# Patient Record
Sex: FEMALE | Race: WHITE | Employment: OTHER | URBAN - METROPOLITAN AREA
[De-identification: names, ages, dates, MRNs, and addresses within clinical notes are randomized per-mention and may not be internally consistent; named-entity substitution may affect disease eponyms.]

---

## 2017-01-25 RX ORDER — FERROUS SULFATE 7.5 MG/0.5
SYRINGE (EA) ORAL DAILY
COMMUNITY
End: 2017-07-11

## 2017-02-06 ENCOUNTER — HOSPITAL ENCOUNTER (OUTPATIENT)
Dept: PHYSICAL THERAPY | Facility: HOSPITAL | Age: 67
Setting detail: THERAPIES SERIES
Discharge: HOME | End: 2017-02-06
Attending: ORTHOPAEDIC SURGERY
Payer: MEDICARE

## 2017-02-06 ENCOUNTER — LABORATORY ENCOUNTER (OUTPATIENT)
Dept: LAB | Facility: HOSPITAL | Age: 67
End: 2017-02-06
Attending: ORTHOPAEDIC SURGERY
Payer: MEDICARE

## 2017-02-06 DIAGNOSIS — Z01.818 PRE-OP TESTING: ICD-10-CM

## 2017-02-06 DIAGNOSIS — M16.11 OSTEOARTHRITIS OF RIGHT HIP: ICD-10-CM

## 2017-02-06 LAB
ALBUMIN SERPL-MCNC: 4.5 G/DL (ref 3.5–4.8)
ALP LIVER SERPL-CCNC: 61 U/L (ref 50–130)
ALT SERPL-CCNC: 26 U/L (ref 14–54)
ANTIBODY SCREEN: NEGATIVE
APTT PPP: 29 SECONDS (ref 25–34)
AST SERPL-CCNC: 23 U/L (ref 15–41)
BASOPHILS # BLD AUTO: 0.04 X10(3) UL (ref 0–0.1)
BASOPHILS NFR BLD AUTO: 0.5 %
BILIRUB SERPL-MCNC: 0.5 MG/DL (ref 0.1–2)
BUN BLD-MCNC: 22 MG/DL (ref 8–20)
CALCIUM BLD-MCNC: 10.5 MG/DL (ref 8.3–10.3)
CHLORIDE: 101 MMOL/L (ref 101–111)
CO2: 27 MMOL/L (ref 22–32)
CREAT BLD-MCNC: 0.91 MG/DL (ref 0.55–1.02)
EOSINOPHIL # BLD AUTO: 0.05 X10(3) UL (ref 0–0.3)
EOSINOPHIL NFR BLD AUTO: 0.7 %
ERYTHROCYTE [DISTWIDTH] IN BLOOD BY AUTOMATED COUNT: 11.7 % (ref 11.5–16)
GLUCOSE BLD-MCNC: 99 MG/DL (ref 70–99)
HCT VFR BLD AUTO: 41.2 % (ref 34–50)
HGB BLD-MCNC: 14.1 G/DL (ref 12–16)
IMMATURE GRANULOCYTE COUNT: 0.02 X10(3) UL (ref 0–1)
IMMATURE GRANULOCYTE RATIO %: 0.3 %
INR BLD: 0.99 (ref 0.89–1.12)
LYMPHOCYTES # BLD AUTO: 1.67 X10(3) UL (ref 0.9–4)
LYMPHOCYTES NFR BLD AUTO: 22.8 %
M PROTEIN MFR SERPL ELPH: 8.2 G/DL (ref 6.1–8.3)
MCH RBC QN AUTO: 31.9 PG (ref 27–33.2)
MCHC RBC AUTO-ENTMCNC: 34.2 G/DL (ref 31–37)
MCV RBC AUTO: 93.2 FL (ref 81–100)
MONOCYTES # BLD AUTO: 0.53 X10(3) UL (ref 0.1–0.6)
MONOCYTES NFR BLD AUTO: 7.2 %
NEUTROPHIL ABS PRELIM: 5.02 X10 (3) UL (ref 1.3–6.7)
NEUTROPHILS # BLD AUTO: 5.02 X10(3) UL (ref 1.3–6.7)
NEUTROPHILS NFR BLD AUTO: 68.5 %
PLATELET # BLD AUTO: 256 10(3)UL (ref 150–450)
POTASSIUM SERPL-SCNC: 4.4 MMOL/L (ref 3.6–5.1)
PSA SERPL DL<=0.01 NG/ML-MCNC: 13.4 SECONDS (ref 12.3–14.8)
RBC # BLD AUTO: 4.42 X10(6)UL (ref 3.8–5.1)
RED CELL DISTRIBUTION WIDTH-SD: 39.5 FL (ref 35.1–46.3)
RH BLOOD TYPE: POSITIVE
SODIUM SERPL-SCNC: 136 MMOL/L (ref 136–144)
WBC # BLD AUTO: 7.3 X10(3) UL (ref 4–13)

## 2017-02-06 PROCEDURE — 87081 CULTURE SCREEN ONLY: CPT

## 2017-02-06 PROCEDURE — 85730 THROMBOPLASTIN TIME PARTIAL: CPT

## 2017-02-06 PROCEDURE — 86900 BLOOD TYPING SEROLOGIC ABO: CPT

## 2017-02-06 PROCEDURE — 87147 CULTURE TYPE IMMUNOLOGIC: CPT

## 2017-02-06 PROCEDURE — 86850 RBC ANTIBODY SCREEN: CPT

## 2017-02-06 PROCEDURE — 80053 COMPREHEN METABOLIC PANEL: CPT

## 2017-02-06 PROCEDURE — 85610 PROTHROMBIN TIME: CPT

## 2017-02-06 PROCEDURE — 85025 COMPLETE CBC W/AUTO DIFF WBC: CPT

## 2017-02-06 PROCEDURE — 36415 COLL VENOUS BLD VENIPUNCTURE: CPT

## 2017-02-06 PROCEDURE — 86901 BLOOD TYPING SEROLOGIC RH(D): CPT

## 2017-02-10 PROCEDURE — 36415 COLL VENOUS BLD VENIPUNCTURE: CPT | Performed by: FAMILY MEDICINE

## 2017-02-10 PROCEDURE — 80053 COMPREHEN METABOLIC PANEL: CPT | Performed by: FAMILY MEDICINE

## 2017-02-10 PROCEDURE — 83970 ASSAY OF PARATHORMONE: CPT | Performed by: FAMILY MEDICINE

## 2017-02-20 NOTE — H&P
[address]    PATIENT'S NAME: Maged Bennett   ATTENDING PHYSICIAN: Susy Camejo M.D.    PATIENT ACCOUNT#:   [de-identified]    LOCATION:  [facility]  MEDICAL RECORD #:   [id number]       YOB: 1950  ADMISSION DATE:       02/24/2017    HISTORY 2015.    MEDICATIONS:  Mupirocin ointment, pantoprazole, lisinopril-hydrochlorothiazide, simvastatin, Lodine, trazodone, sertraline, ferrous sulfate, aspirin, Flonase, fish oil, cranberry, lysine, multivitamin, magnesium. ALLERGIES:  None.     SOCIAL HIS

## 2017-02-24 ENCOUNTER — SURGERY (OUTPATIENT)
Age: 67
End: 2017-02-24

## 2017-02-24 ENCOUNTER — ANESTHESIA (OUTPATIENT)
Dept: SURGERY | Facility: HOSPITAL | Age: 67
End: 2017-02-24

## 2017-02-24 ENCOUNTER — ANESTHESIA EVENT (OUTPATIENT)
Dept: SURGERY | Facility: HOSPITAL | Age: 67
End: 2017-02-24

## 2017-02-24 ENCOUNTER — APPOINTMENT (OUTPATIENT)
Dept: GENERAL RADIOLOGY | Facility: HOSPITAL | Age: 67
DRG: 470 | End: 2017-02-24
Attending: ORTHOPAEDIC SURGERY
Payer: MEDICARE

## 2017-02-24 ENCOUNTER — HOSPITAL ENCOUNTER (INPATIENT)
Facility: HOSPITAL | Age: 67
LOS: 2 days | Discharge: HOME HEALTH | DRG: 470 | End: 2017-02-26
Attending: ORTHOPAEDIC SURGERY | Admitting: ORTHOPAEDIC SURGERY
Payer: MEDICARE

## 2017-02-24 DIAGNOSIS — M16.11 OSTEOARTHRITIS OF RIGHT HIP: Primary | ICD-10-CM

## 2017-02-24 LAB — CREAT BLD-MCNC: 0.76 MG/DL (ref 0.55–1.02)

## 2017-02-24 PROCEDURE — 94660 CPAP INITIATION&MGMT: CPT

## 2017-02-24 PROCEDURE — 88304 TISSUE EXAM BY PATHOLOGIST: CPT | Performed by: ORTHOPAEDIC SURGERY

## 2017-02-24 PROCEDURE — 88311 DECALCIFY TISSUE: CPT | Performed by: ORTHOPAEDIC SURGERY

## 2017-02-24 PROCEDURE — 73501 X-RAY EXAM HIP UNI 1 VIEW: CPT

## 2017-02-24 PROCEDURE — 97530 THERAPEUTIC ACTIVITIES: CPT

## 2017-02-24 PROCEDURE — 82565 ASSAY OF CREATININE: CPT | Performed by: PHYSICIAN ASSISTANT

## 2017-02-24 PROCEDURE — 0SR903A REPLACEMENT OF RIGHT HIP JOINT WITH CERAMIC SYNTHETIC SUBSTITUTE, UNCEMENTED, OPEN APPROACH: ICD-10-PCS | Performed by: ORTHOPAEDIC SURGERY

## 2017-02-24 PROCEDURE — 76942 ECHO GUIDE FOR BIOPSY: CPT | Performed by: ORTHOPAEDIC SURGERY

## 2017-02-24 PROCEDURE — 97162 PT EVAL MOD COMPLEX 30 MIN: CPT

## 2017-02-24 PROCEDURE — 3E0T3CZ INTRODUCTION OF REGIONAL ANESTHETIC INTO PERIPHERAL NERVES AND PLEXI, PERCUTANEOUS APPROACH: ICD-10-PCS | Performed by: ANESTHESIOLOGY

## 2017-02-24 DEVICE — BIOLOX® DELTA, CERAMIC FEMORAL HEAD, S, Ø 36/-3.5, TAPER 12/14
Type: IMPLANTABLE DEVICE | Site: HIP | Status: FUNCTIONAL
Brand: BIOLOX® DELTA

## 2017-02-24 DEVICE — MOD CUP NEU LNR LG 50/52/54X36: Type: IMPLANTABLE DEVICE | Site: HIP | Status: FUNCTIONAL

## 2017-02-24 DEVICE — IMPLANTABLE DEVICE: Type: IMPLANTABLE DEVICE | Site: HIP | Status: FUNCTIONAL

## 2017-02-24 DEVICE — BONE SCREW 6.5X40 SELF-TAP: Type: IMPLANTABLE DEVICE | Site: HIP | Status: FUNCTIONAL

## 2017-02-24 DEVICE — TM MOD CUP 52MM CLUSTER: Type: IMPLANTABLE DEVICE | Site: HIP | Status: FUNCTIONAL

## 2017-02-24 DEVICE — BONE SCREW 6.5X25 SELF-TAP: Type: IMPLANTABLE DEVICE | Site: HIP | Status: FUNCTIONAL

## 2017-02-24 RX ORDER — DIPHENHYDRAMINE HCL 25 MG
25 CAPSULE ORAL EVERY 4 HOURS PRN
Status: DISCONTINUED | OUTPATIENT
Start: 2017-02-24 | End: 2017-02-26

## 2017-02-24 RX ORDER — BISACODYL 10 MG
10 SUPPOSITORY, RECTAL RECTAL
Status: DISCONTINUED | OUTPATIENT
Start: 2017-02-24 | End: 2017-02-26

## 2017-02-24 RX ORDER — SODIUM CHLORIDE, SODIUM LACTATE, POTASSIUM CHLORIDE, CALCIUM CHLORIDE 600; 310; 30; 20 MG/100ML; MG/100ML; MG/100ML; MG/100ML
INJECTION, SOLUTION INTRAVENOUS CONTINUOUS
Status: DISCONTINUED | OUTPATIENT
Start: 2017-02-24 | End: 2017-02-24

## 2017-02-24 RX ORDER — KETOROLAC TROMETHAMINE 30 MG/ML
15 INJECTION, SOLUTION INTRAMUSCULAR; INTRAVENOUS EVERY 6 HOURS PRN
Status: DISCONTINUED | OUTPATIENT
Start: 2017-02-24 | End: 2017-02-24 | Stop reason: HOSPADM

## 2017-02-24 RX ORDER — PANTOPRAZOLE SODIUM 40 MG/1
40 TABLET, DELAYED RELEASE ORAL
Status: DISCONTINUED | OUTPATIENT
Start: 2017-02-24 | End: 2017-02-26

## 2017-02-24 RX ORDER — ZOLPIDEM TARTRATE 5 MG/1
5 TABLET ORAL NIGHTLY PRN
Status: DISCONTINUED | OUTPATIENT
Start: 2017-02-24 | End: 2017-02-26

## 2017-02-24 RX ORDER — SODIUM PHOSPHATE, DIBASIC AND SODIUM PHOSPHATE, MONOBASIC 7; 19 G/133ML; G/133ML
1 ENEMA RECTAL ONCE AS NEEDED
Status: ACTIVE | OUTPATIENT
Start: 2017-02-24 | End: 2017-02-24

## 2017-02-24 RX ORDER — SCOLOPAMINE TRANSDERMAL SYSTEM 1 MG/1
1 PATCH, EXTENDED RELEASE TRANSDERMAL ONCE
Status: DISCONTINUED | OUTPATIENT
Start: 2017-02-24 | End: 2017-02-26

## 2017-02-24 RX ORDER — OXYCODONE HYDROCHLORIDE 10 MG/1
20 TABLET ORAL EVERY 4 HOURS PRN
Status: ACTIVE | OUTPATIENT
Start: 2017-02-24 | End: 2017-02-26

## 2017-02-24 RX ORDER — OXYCODONE HYDROCHLORIDE 10 MG/1
10 TABLET ORAL EVERY 4 HOURS PRN
Status: DISPENSED | OUTPATIENT
Start: 2017-02-24 | End: 2017-02-26

## 2017-02-24 RX ORDER — MEPERIDINE HYDROCHLORIDE 25 MG/ML
12.5 INJECTION INTRAMUSCULAR; INTRAVENOUS; SUBCUTANEOUS AS NEEDED
Status: DISCONTINUED | OUTPATIENT
Start: 2017-02-24 | End: 2017-02-24 | Stop reason: HOSPADM

## 2017-02-24 RX ORDER — KETOROLAC TROMETHAMINE 30 MG/ML
30 INJECTION, SOLUTION INTRAMUSCULAR; INTRAVENOUS EVERY 6 HOURS PRN
Status: DISCONTINUED | OUTPATIENT
Start: 2017-02-24 | End: 2017-02-24 | Stop reason: HOSPADM

## 2017-02-24 RX ORDER — LISINOPRIL AND HYDROCHLOROTHIAZIDE 20; 12.5 MG/1; MG/1
1 TABLET ORAL DAILY
Status: DISCONTINUED | OUTPATIENT
Start: 2017-02-24 | End: 2017-02-24 | Stop reason: SDUPTHER

## 2017-02-24 RX ORDER — OXYCODONE HCL 10 MG/1
10 TABLET, FILM COATED, EXTENDED RELEASE ORAL
Status: COMPLETED | OUTPATIENT
Start: 2017-02-24 | End: 2017-02-24

## 2017-02-24 RX ORDER — DIPHENHYDRAMINE HYDROCHLORIDE 50 MG/ML
12.5 INJECTION INTRAMUSCULAR; INTRAVENOUS EVERY 4 HOURS PRN
Status: DISCONTINUED | OUTPATIENT
Start: 2017-02-24 | End: 2017-02-26

## 2017-02-24 RX ORDER — OXYCODONE HCL 10 MG/1
10 TABLET, FILM COATED, EXTENDED RELEASE ORAL
Status: DISPENSED | OUTPATIENT
Start: 2017-02-24 | End: 2017-02-25

## 2017-02-24 RX ORDER — SCOLOPAMINE TRANSDERMAL SYSTEM 1 MG/1
PATCH, EXTENDED RELEASE TRANSDERMAL
Status: DISPENSED
Start: 2017-02-24 | End: 2017-02-24

## 2017-02-24 RX ORDER — DOCUSATE SODIUM 100 MG/1
100 CAPSULE, LIQUID FILLED ORAL 2 TIMES DAILY
Qty: 60 CAPSULE | Refills: 1 | Status: SHIPPED | OUTPATIENT
Start: 2017-02-24 | End: 2017-04-03 | Stop reason: ALTCHOICE

## 2017-02-24 RX ORDER — HYDROMORPHONE HYDROCHLORIDE 1 MG/ML
0.3 INJECTION, SOLUTION INTRAMUSCULAR; INTRAVENOUS; SUBCUTANEOUS EVERY 2 HOUR PRN
Status: ACTIVE | OUTPATIENT
Start: 2017-02-24 | End: 2017-02-26

## 2017-02-24 RX ORDER — OXYCODONE HYDROCHLORIDE 5 MG/1
5 TABLET ORAL EVERY 4 HOURS PRN
Status: ACTIVE | OUTPATIENT
Start: 2017-02-24 | End: 2017-02-26

## 2017-02-24 RX ORDER — NALOXONE HYDROCHLORIDE 0.4 MG/ML
80 INJECTION, SOLUTION INTRAMUSCULAR; INTRAVENOUS; SUBCUTANEOUS AS NEEDED
Status: DISCONTINUED | OUTPATIENT
Start: 2017-02-24 | End: 2017-02-24 | Stop reason: HOSPADM

## 2017-02-24 RX ORDER — HYDROMORPHONE HYDROCHLORIDE 1 MG/ML
0.5 INJECTION, SOLUTION INTRAMUSCULAR; INTRAVENOUS; SUBCUTANEOUS EVERY 2 HOUR PRN
Status: DISPENSED | OUTPATIENT
Start: 2017-02-24 | End: 2017-02-26

## 2017-02-24 RX ORDER — HYDROMORPHONE HYDROCHLORIDE 1 MG/ML
0.2 INJECTION, SOLUTION INTRAMUSCULAR; INTRAVENOUS; SUBCUTANEOUS EVERY 2 HOUR PRN
Status: ACTIVE | OUTPATIENT
Start: 2017-02-24 | End: 2017-02-26

## 2017-02-24 RX ORDER — ATORVASTATIN CALCIUM 10 MG/1
10 TABLET, FILM COATED ORAL NIGHTLY
Status: DISCONTINUED | OUTPATIENT
Start: 2017-02-24 | End: 2017-02-26

## 2017-02-24 RX ORDER — METOCLOPRAMIDE HYDROCHLORIDE 5 MG/ML
10 INJECTION INTRAMUSCULAR; INTRAVENOUS EVERY 6 HOURS PRN
Status: ACTIVE | OUTPATIENT
Start: 2017-02-24 | End: 2017-02-26

## 2017-02-24 RX ORDER — ACETAMINOPHEN 325 MG/1
650 TABLET ORAL 4 TIMES DAILY
Status: COMPLETED | OUTPATIENT
Start: 2017-02-24 | End: 2017-02-25

## 2017-02-24 RX ORDER — MIDAZOLAM HYDROCHLORIDE 1 MG/ML
1 INJECTION INTRAMUSCULAR; INTRAVENOUS EVERY 5 MIN PRN
Status: DISCONTINUED | OUTPATIENT
Start: 2017-02-24 | End: 2017-02-24 | Stop reason: HOSPADM

## 2017-02-24 RX ORDER — DIPHENHYDRAMINE HYDROCHLORIDE 50 MG/ML
25 INJECTION INTRAMUSCULAR; INTRAVENOUS ONCE AS NEEDED
Status: ACTIVE | OUTPATIENT
Start: 2017-02-24 | End: 2017-02-24

## 2017-02-24 RX ORDER — HYDROMORPHONE HYDROCHLORIDE 1 MG/ML
0.4 INJECTION, SOLUTION INTRAMUSCULAR; INTRAVENOUS; SUBCUTANEOUS EVERY 2 HOUR PRN
Status: ACTIVE | OUTPATIENT
Start: 2017-02-24 | End: 2017-02-26

## 2017-02-24 RX ORDER — DOCUSATE SODIUM 100 MG/1
100 CAPSULE, LIQUID FILLED ORAL 2 TIMES DAILY
Status: DISCONTINUED | OUTPATIENT
Start: 2017-02-24 | End: 2017-02-26

## 2017-02-24 RX ORDER — OXYCODONE HCL 10 MG/1
TABLET, FILM COATED, EXTENDED RELEASE ORAL
Status: DISPENSED
Start: 2017-02-24 | End: 2017-02-24

## 2017-02-24 RX ORDER — DEXTROSE AND SODIUM CHLORIDE 5; .45 G/100ML; G/100ML
INJECTION, SOLUTION INTRAVENOUS CONTINUOUS
Status: DISCONTINUED | OUTPATIENT
Start: 2017-02-24 | End: 2017-02-26

## 2017-02-24 RX ORDER — ACETAMINOPHEN 325 MG/1
TABLET ORAL
Status: COMPLETED
Start: 2017-02-24 | End: 2017-02-24

## 2017-02-24 RX ORDER — SODIUM CHLORIDE, SODIUM LACTATE, POTASSIUM CHLORIDE, CALCIUM CHLORIDE 600; 310; 30; 20 MG/100ML; MG/100ML; MG/100ML; MG/100ML
INJECTION, SOLUTION INTRAVENOUS CONTINUOUS
Status: DISCONTINUED | OUTPATIENT
Start: 2017-02-24 | End: 2017-02-26

## 2017-02-24 RX ORDER — ACETAMINOPHEN 325 MG/1
650 TABLET ORAL ONCE
Status: COMPLETED | OUTPATIENT
Start: 2017-02-24 | End: 2017-02-24

## 2017-02-24 RX ORDER — TRAZODONE HYDROCHLORIDE 50 MG/1
50 TABLET ORAL NIGHTLY
Status: DISCONTINUED | OUTPATIENT
Start: 2017-02-24 | End: 2017-02-26

## 2017-02-24 RX ORDER — HYDROMORPHONE HYDROCHLORIDE 1 MG/ML
0.4 INJECTION, SOLUTION INTRAMUSCULAR; INTRAVENOUS; SUBCUTANEOUS EVERY 5 MIN PRN
Status: DISCONTINUED | OUTPATIENT
Start: 2017-02-24 | End: 2017-02-24 | Stop reason: HOSPADM

## 2017-02-24 RX ORDER — CYCLOBENZAPRINE HCL 5 MG
5 TABLET ORAL 3 TIMES DAILY PRN
Status: DISCONTINUED | OUTPATIENT
Start: 2017-02-24 | End: 2017-02-26

## 2017-02-24 RX ORDER — HYDROCODONE BITARTRATE AND ACETAMINOPHEN 5; 325 MG/1; MG/1
1-2 TABLET ORAL EVERY 4 HOURS PRN
Qty: 80 TABLET | Refills: 0 | Status: SHIPPED | OUTPATIENT
Start: 2017-02-24 | End: 2017-02-26

## 2017-02-24 RX ORDER — ONDANSETRON 2 MG/ML
4 INJECTION INTRAMUSCULAR; INTRAVENOUS EVERY 4 HOURS PRN
Status: DISCONTINUED | OUTPATIENT
Start: 2017-02-24 | End: 2017-02-26

## 2017-02-24 RX ORDER — ONDANSETRON 2 MG/ML
4 INJECTION INTRAMUSCULAR; INTRAVENOUS AS NEEDED
Status: DISCONTINUED | OUTPATIENT
Start: 2017-02-24 | End: 2017-02-24 | Stop reason: HOSPADM

## 2017-02-24 RX ORDER — TRAMADOL HYDROCHLORIDE 50 MG/1
50 TABLET ORAL EVERY 12 HOURS
Status: COMPLETED | OUTPATIENT
Start: 2017-02-24 | End: 2017-02-25

## 2017-02-24 RX ORDER — KETOROLAC TROMETHAMINE 30 MG/ML
15 INJECTION, SOLUTION INTRAMUSCULAR; INTRAVENOUS EVERY 6 HOURS
Status: COMPLETED | OUTPATIENT
Start: 2017-02-24 | End: 2017-02-25

## 2017-02-24 RX ORDER — POLYETHYLENE GLYCOL 3350 17 G/17G
17 POWDER, FOR SOLUTION ORAL DAILY PRN
Status: DISCONTINUED | OUTPATIENT
Start: 2017-02-24 | End: 2017-02-26

## 2017-02-24 RX ORDER — OXYCODONE HYDROCHLORIDE 15 MG/1
15 TABLET ORAL EVERY 4 HOURS PRN
Status: ACTIVE | OUTPATIENT
Start: 2017-02-24 | End: 2017-02-26

## 2017-02-24 NOTE — INTERVAL H&P NOTE
Pre-op Diagnosis: OSTEOARTHRITIS RIGHT HIP    The above referenced H&P was reviewed by ISAAC Tamayo on 2/24/2017, the patient was examined and no significant changes have occurred in the patient's condition since the H&P was performed.   I discussed wi

## 2017-02-24 NOTE — ANESTHESIA PREPROCEDURE EVALUATION
PRE-OP EVALUATION    Patient Name: Tereza Waddell    Pre-op Diagnosis: OSTEOARTHRITIS RIGHT HIP    Procedure(s):  RIGHT TOTAL HIP ARTHROPLASTY    Surgeon(s) and Role:     Edi Upton MD - Primary    Pre-op vitals reviewed.   Temp: 98.5 °F (36.9 °C)  Pu mouth daily. Disp: 90 tablet Rfl: 1   aspirin 81 MG Oral Tab Take 81 mg by mouth daily. On hold  Disp:  Rfl:    Fluticasone Propionate (FLONASE) 50 MCG/ACT Nasal Suspension 1 spray by Nasal route daily.  Disp: 1 Bottle Rfl: 6   TraZODone HCl 50 MG Oral Tab Smokeless tobacco: Never Used    Alcohol Use: Yes  0.0 oz/week    0 Standard drinks or equivalent per week         Comment: rarely - 1 per month       Drug Use: No     Available pre-op labs reviewed.     Lab Results  Component Value Date   WBC 7.3 02/06/201

## 2017-02-24 NOTE — CONSULTS
[facility] Hospitalist Initial Consult       CC: medical management s/p R MAYTE    History of Present Illness: Patient is a 73year old female with PMH sig for  Depression, GERD, HTN, who presents sp the above procedure.  She tolerated the procedure well without any Current Meds:  Scheduled:   • Scopolamine  1 patch Transdermal Once   • OxyCODONE HCl ER       • Scopolamine       • CeFAZolin Sodium       • Sertraline HCl  50 mg Oral Daily   • Atorvastatin Calcium  10 mg Oral Nightly   • TraZODone HCl  50 mg Ora 100%  LMP 02/16/1997      Gen: No acute distress  Eyes: Pink conjunctivae, No ptosis, PERRL  Neck: No masses, trachae midline. No thyromegaly  Pulm: Lungs clear bilaterally, normal respiratory effort  CV: Heart with regular rate and rhythm, no murmur.   Nor

## 2017-02-24 NOTE — OPERATIVE REPORT
[address]    PATIENT'S NAME: Maged Bennett   ATTENDING PHYSICIAN: Keyur Menard M.D. OPERATING PHYSICIAN: Keyur Menard M.D.    PATIENT ACCOUNT#:   [de-identified]    LOCATION:  [address] [place] [address]  MEDICAL RECORD #:   [id number]       DATE OF BIRTH:  [birth year] gluteus minh, and the fibers of gluteus minh were gently teased apart with a gloved digit. A self-retaining Charnley retractor was positioned with the large blades. Care was taken to avoid injury to the sciatic nerve.   The hip was placed in marked used to enlarge the  hole laterally and anteriorly. An anteversion osteotome was used to remove bone from the proximal metaphysis.   I began broaching with a size 4 broach and broached up to a size 7.5 mm broach when excellent rotational stability was restored and offset was restored. The trials were removed. The hip was copiously irrigated. The final components were chosen.   A size 7.5 standard offset standard neck David ML taper press-fit plasma-sprayed cementless femoral component was impacted in

## 2017-02-24 NOTE — ANESTHESIA POSTPROCEDURE EVALUATION
[address] [facility] Patient Status:  Inpatient   Age/Gender 73year old female MRN [id number]   [facility] 3SW-A Attending Jazmin Hart MD   Hosp Day # 0 PCP Kobe Arreguin MD       Anesthesia Post-op Note    Procedure(s):  RIGHT

## 2017-02-24 NOTE — BRIEF OP NOTE
[address] [place] [facility]  Brief Op Note     [facility] Location: OR   [facility] [id number] MRN [id number]   Admission Date 2/24/2017 Operation Date 2/24/2017   Attending Physician Zuleyka Mac MD Operating Physician ISAAC Rodney       Pre-Operative Jelani Cisneros

## 2017-02-24 NOTE — PHYSICAL THERAPY NOTE
PHYSICAL THERAPY HIP EVALUATION - INPATIENT     Room Number: 354/354-A  Evaluation Date: 2/24/2017  Type of Evaluation: Initial  Physician Order: PT Eval and Treat    Presenting Problem: S/p Right MAYTE on 02/24/17  Reason for Therapy: Mobility Dysfunction a present @ bedside. Patient self-stated goal is to be able to walk pain free.     OBJECTIVE  Precautions: MAYTE - posterior  Fall Risk: High fall risk    WEIGHT BEARING RESTRICTION  Weight Bearing Restriction: R lower extremity        R Lower Extremity: May Weir ABILITY STATUS  Gait Assessment   Gait Assistance: Minimum assistance (CGA for safety)  Distance (ft): 150 ft  Assistive Device: Rolling walker  Pattern: R Decreased stance time  Stoop/Curb Assistance: Not tested  Comment : Above score is based on FIM defi training;Transfer training;Balance training  Rehab Potential : Good  Frequency (Obs): BID  Number of Visits to Meet Established Goals: 5      CURRENT GOALS  Goal #1  Patient is able to demonstrate supine - sit EOB @ level: supervision     Goal #2  Patient

## 2017-02-24 NOTE — CM/SW NOTE
02/24/17 1400   CM/SW Referral Data   Referral Source Physician;Social Work (self-referral)   Reason for Referral Discharge planning   Informant Patient;Spouse   Social History   Recreational Drug/Alcohol Use Love   Patient Info   Patient's Mental Statu

## 2017-02-25 LAB
BUN BLD-MCNC: 12 MG/DL (ref 8–20)
CALCIUM BLD-MCNC: 8.9 MG/DL (ref 8.3–10.3)
CHLORIDE: 101 MMOL/L (ref 101–111)
CO2: 32 MMOL/L (ref 22–32)
CREAT BLD-MCNC: 0.74 MG/DL (ref 0.55–1.02)
ERYTHROCYTE [DISTWIDTH] IN BLOOD BY AUTOMATED COUNT: 11.9 % (ref 11.5–16)
GLUCOSE BLD-MCNC: 127 MG/DL (ref 70–99)
HCT VFR BLD AUTO: 31.6 % (ref 34–50)
HGB BLD-MCNC: 10.6 G/DL (ref 12–16)
MCH RBC QN AUTO: 31.8 PG (ref 27–33.2)
MCHC RBC AUTO-ENTMCNC: 33.5 G/DL (ref 31–37)
MCV RBC AUTO: 94.9 FL (ref 81–100)
PLATELET # BLD AUTO: 185 10(3)UL (ref 150–450)
POTASSIUM SERPL-SCNC: 4 MMOL/L (ref 3.6–5.1)
RBC # BLD AUTO: 3.33 X10(6)UL (ref 3.8–5.1)
RED CELL DISTRIBUTION WIDTH-SD: 41.1 FL (ref 35.1–46.3)
SODIUM SERPL-SCNC: 138 MMOL/L (ref 136–144)
WBC # BLD AUTO: 6.8 X10(3) UL (ref 4–13)

## 2017-02-25 PROCEDURE — 97116 GAIT TRAINING THERAPY: CPT

## 2017-02-25 PROCEDURE — 97535 SELF CARE MNGMENT TRAINING: CPT

## 2017-02-25 PROCEDURE — 80048 BASIC METABOLIC PNL TOTAL CA: CPT | Performed by: PHYSICIAN ASSISTANT

## 2017-02-25 PROCEDURE — 97150 GROUP THERAPEUTIC PROCEDURES: CPT

## 2017-02-25 PROCEDURE — 97166 OT EVAL MOD COMPLEX 45 MIN: CPT

## 2017-02-25 PROCEDURE — 85027 COMPLETE CBC AUTOMATED: CPT | Performed by: PHYSICIAN ASSISTANT

## 2017-02-25 RX ORDER — HYDROCODONE BITARTRATE AND ACETAMINOPHEN 10; 325 MG/1; MG/1
2 TABLET ORAL EVERY 4 HOURS PRN
Status: DISCONTINUED | OUTPATIENT
Start: 2017-02-26 | End: 2017-02-26

## 2017-02-25 RX ORDER — HYDROCODONE BITARTRATE AND ACETAMINOPHEN 10; 325 MG/1; MG/1
1 TABLET ORAL EVERY 4 HOURS PRN
Status: DISCONTINUED | OUTPATIENT
Start: 2017-02-26 | End: 2017-02-26

## 2017-02-25 NOTE — RESPIRATORY THERAPY NOTE
FRANCI : EQUIPMENT USE: DAILY SUMMARY                                            SET MODE: AFLEX                                          USAGE IN HOURS: 7:30                                          90% EXP. PRESSURE(

## 2017-02-25 NOTE — PAYOR COMM NOTE
Attending Physician: Carolina Bruce MD    Review Type: ADMISSION   ReviewerRenie Grief       Date: February 25, 2017 - 1:07 PM  Payor: BC[employer] MEDICARE ADV PPO  Authorization Number: [id number]  Admit date: 2/24/2017  5:02 AM   Admitted from [facility] hydrochlorothiazide (MICROZIDE) 12.5 mg for Zestoretic 20-12.5 ([facility] only)     Date Action Dose Route User    2/25/2017 0907 Given (none) Oral Stephanie Nova, VANE      magnesium hydroxide (MILK OF MAGNESIA) 400 MG/5ML suspension 30 mL     Date Action Dose

## 2017-02-25 NOTE — PROGRESS NOTES
Post Op Day 1 Ortho Note    Status Post Nerve Block:  Type of Nerve Block: Right Fascia Iliaca  Single Injection Nerve Block    Post op review: No evidence of immediate block related complications, No paresthesia noted, Able to lift leg(s), Able to plantar

## 2017-02-25 NOTE — PROGRESS NOTES
[facility] Hospitalist Progress Note     PCP: Lonnie Singer MD    CC:  Follow up    SUBJECTIVE:  Slater out, has not urinated yet.  +flatus.  No n/v. Pain controlled    OBJECTIVE:  Temp:  [98 °F (36.7 °C)-99 °F (37.2 °C)] 98 °F (36.7 °C)  Pulse:  [53-68] 60  Resp: [email or web address]   • acetaminophen  650 mg Oral QID   • ketorolac (TORADOL) injection  15 mg Intravenous Q6H   • TraMADol HCl  50 mg Oral Q12H   • lisinopril-hydrochlorothiazide (ZESTORETIC 20-12.5) combination tablet   Oral Daily   • rivaroxaban  10 mg Oral

## 2017-02-25 NOTE — OCCUPATIONAL THERAPY NOTE
OCCUPATIONAL THERAPY EVALUATION - INPATIENT     Room Number: 354/354-A  Evaluation Date: 2/25/2017  Type of Evaluation: Initial  Presenting Problem: R MAYTE posterior WBAT, OA    Physician Order: IP Consult to Occupational Therapy  Reason for Therapy: ADL/IA both.\"    Patient self-stated goal is \"be okay with some help from my . \"    OBJECTIVE  Precautions: MAYTE - posterior  Fall Risk: High fall risk    WEIGHT BEARING RESTRICTION  Weight Bearing Restriction: R lower extremity        R Lower Extremity: tested  Sit to Stand: Supervision    Skilled Therapy Provided: Received pt seated in chair. Pt recalled 3/3 posterior hip precautions w/o cueing.   Education and assessment of sit-stand (sup w/ rw, with min cueing for hand/foot placement), LB dressing (don Visits to Meet Established Goals: 2    ADL Goals  Patient will perform lower body dressing:  with supervision  Patient will perform toileting: with supervision    Functional Transfer Goals  Patient will transfer from sit to supine:  with supervision  Noha

## 2017-02-25 NOTE — PROGRESS NOTES
[address] [facility] Patient Status:  Inpatient    3/20/1950 MRN [id number]   [facility] 3SW-A Attending Luisito Correa MD   Hosp Day # 1 PCP Oanh Oviedo MD         S:  Patient doing well. No nausea. No SOB, CP or calf pain.

## 2017-02-25 NOTE — CM/SW NOTE
[facility] accepted this patients referral. sw to follow      [facility]  P: [phone]  F: [phone]

## 2017-02-25 NOTE — PHYSICAL THERAPY NOTE
PHYSICAL THERAPY HIP TREATMENT NOTE - INPATIENT      Room Number: 354/354-A     Session: 1 & 2   Number of Visits to Meet Established Goals: 5    Presenting Problem: S/p Right MAYTE on 02/24/17    Problem List  Active Problems:    Osteoarthritis of right hip (e.g., wheelchair, bedside commode, etc.): None   -   Moving from lying on back to sitting on the side of the bed?: None   How much help from another person does the patient currently need. ..   -   Moving to and from a bed to a chair (including a wheelchai is seen BID for group therapy. Patient is making progressions in activity tolerance/endurance.  Patient continues to demonstrate impairments in strength, balance, endurance, mobility and functional independence, and will benefit from continued skilled PT du

## 2017-02-26 VITALS
HEIGHT: 66 IN | BODY MASS INDEX: 27.32 KG/M2 | HEART RATE: 70 BPM | OXYGEN SATURATION: 96 % | WEIGHT: 170 LBS | TEMPERATURE: 99 F | SYSTOLIC BLOOD PRESSURE: 127 MMHG | DIASTOLIC BLOOD PRESSURE: 61 MMHG | RESPIRATION RATE: 18 BRPM

## 2017-02-26 LAB
ERYTHROCYTE [DISTWIDTH] IN BLOOD BY AUTOMATED COUNT: 11.9 % (ref 11.5–16)
HCT VFR BLD AUTO: 30.5 % (ref 34–50)
HGB BLD-MCNC: 10.4 G/DL (ref 12–16)
MCH RBC QN AUTO: 31.4 PG (ref 27–33.2)
MCHC RBC AUTO-ENTMCNC: 34.1 G/DL (ref 31–37)
MCV RBC AUTO: 92.1 FL (ref 81–100)
PLATELET # BLD AUTO: 213 10(3)UL (ref 150–450)
RBC # BLD AUTO: 3.31 X10(6)UL (ref 3.8–5.1)
RED CELL DISTRIBUTION WIDTH-SD: 40.3 FL (ref 35.1–46.3)
WBC # BLD AUTO: 8.7 X10(3) UL (ref 4–13)

## 2017-02-26 PROCEDURE — 97150 GROUP THERAPEUTIC PROCEDURES: CPT

## 2017-02-26 PROCEDURE — 97535 SELF CARE MNGMENT TRAINING: CPT

## 2017-02-26 PROCEDURE — 97116 GAIT TRAINING THERAPY: CPT

## 2017-02-26 PROCEDURE — 85027 COMPLETE CBC AUTOMATED: CPT | Performed by: PHYSICIAN ASSISTANT

## 2017-02-26 RX ORDER — HYDROCODONE BITARTRATE AND ACETAMINOPHEN 5; 325 MG/1; MG/1
1-2 TABLET ORAL EVERY 4 HOURS PRN
Qty: 80 TABLET | Refills: 0 | Status: SHIPPED | OUTPATIENT
Start: 2017-02-26 | End: 2017-03-08

## 2017-02-26 NOTE — RESPIRATORY THERAPY NOTE
FRANCI - Equipment Use Daily Summary:  · Set Mode AFLEX  · Usage in hours: 8:40  · 90% Pressure (EPAP) level: 9.5  · 90% Insp Pressure (IPAP):   · AHI: 5.1  · Supplemental Oxygen:   · Comments:

## 2017-02-26 NOTE — DISCHARGE SUMMARY
Discharge Summary  Patient ID:  Caitlyn Padilla  [id number]  73year old  3/20/1950    Admit date: 2/24/2017    Discharge date and time: 2/26/17    Attending Physician: No att. providers found     Reason for admission: [address]    S/p right TH

## 2017-02-26 NOTE — OCCUPATIONAL THERAPY NOTE
OCCUPATIONAL THERAPY TREATMENT NOTE - INPATIENT     Room Number: 354/354-A  Session: 1  Number of Visits to Meet Established Goals: 2    Presenting Problem: R MAYTE posterior WBAT, OA    History related to current admission: R MAYTE d/t OA, H/O: HTN, Depressio which includes using toilet, bedpan or urinal? : A Little  -   Putting on and taking off regular upper body clothing?: None  -   Taking care of personal grooming such as brushing teeth?: None  -   Eating meals?: None    AM-PAC Score:  Score: 21  Approx Deg 2/26    Functional Transfer Goals  Patient will transfer from sit to supine:  with supervision- Met 2/26  Patient will transfer from supine to sit:  with supervision- Met 2/26

## 2017-02-26 NOTE — PROGRESS NOTES
[address] [facility] Patient Status:  Inpatient    3/20/1950 MRN [id number]   [facility] 3SW-A Attending Zuleyka Mac MD   Hosp Day # 2 PCP Ro Ramon MD         S:  Patient doing well. No nausea. No SOB, CP or calf pain.

## 2017-02-26 NOTE — PROGRESS NOTES
[facility] Hospitalist Progress Note     PCP: Andrea Ceballos MD    CC:  Follow up    SUBJECTIVE:  +urinating. Had small BM.  No n/v. Pain controlled    OBJECTIVE:  Temp:  [98.3 °F (36.8 °C)-99.8 °F (37.7 °C)] 99 °F (37.2 °C)  Pulse:  [64-84] 70  Resp:  [18] 18  B lisinopril-hydrochlorothiazide (ZESTORETIC 20-12.5) combination tablet   Oral Daily   • rivaroxaban  10 mg Oral Q24H     • lactated ringers     • Dextrose-NaCl 80 mL/hr at 02/24/17 0931     HYDROcodone-acetaminophen **OR** HYDROcodone-acetaminophen, Zolpid

## 2017-02-26 NOTE — PROGRESS NOTES
Acute Pain Service    Post Op Day 2 Ortho Note    Assessed patient in chair. Patient rates pain 0/10 at rest and 2-3/10 with activity. Patient states [address] is working well to manage pain; denies itching/nausea/dizziness.     Patient able to bear weight on s

## 2017-02-27 PROBLEM — Z47.89 ORTHOPEDIC AFTERCARE: Status: ACTIVE | Noted: 2017-02-27

## 2017-03-13 PROBLEM — Z96.641 S/P HIP REPLACEMENT, RIGHT: Status: ACTIVE | Noted: 2017-03-13

## 2017-04-03 PROBLEM — F41.9 ANXIETY: Status: ACTIVE | Noted: 2017-04-03

## 2017-04-03 PROBLEM — E27.8 ADRENAL NODULE (HCC): Status: ACTIVE | Noted: 2017-04-03

## 2017-04-03 PROBLEM — F32.89 OTHER DEPRESSION: Status: ACTIVE | Noted: 2017-04-03

## 2017-04-03 PROBLEM — M15.9 OSTEOARTHRITIS INVOLVING MULTIPLE JOINTS ON BOTH SIDES OF BODY: Status: ACTIVE | Noted: 2017-04-03

## 2017-04-19 PROBLEM — M20.42 HAMMER TOE OF LEFT FOOT: Status: ACTIVE | Noted: 2017-04-19

## 2017-04-19 PROBLEM — S93.145A: Status: ACTIVE | Noted: 2017-04-19

## 2017-05-23 PROBLEM — Z47.89 AFTERCARE FOLLOWING SURGERY OF THE MUSCULOSKELETAL SYSTEM: Status: ACTIVE | Noted: 2017-05-23

## 2017-07-06 ENCOUNTER — APPOINTMENT (OUTPATIENT)
Dept: CV DIAGNOSTICS | Facility: HOSPITAL | Age: 67
DRG: 176 | End: 2017-07-06
Attending: INTERNAL MEDICINE
Payer: MEDICARE

## 2017-07-06 ENCOUNTER — APPOINTMENT (OUTPATIENT)
Dept: CT IMAGING | Facility: HOSPITAL | Age: 67
DRG: 176 | End: 2017-07-06
Attending: EMERGENCY MEDICINE
Payer: MEDICARE

## 2017-07-06 ENCOUNTER — APPOINTMENT (OUTPATIENT)
Dept: GENERAL RADIOLOGY | Facility: HOSPITAL | Age: 67
DRG: 176 | End: 2017-07-06
Attending: EMERGENCY MEDICINE
Payer: MEDICARE

## 2017-07-06 ENCOUNTER — HOSPITAL ENCOUNTER (INPATIENT)
Facility: HOSPITAL | Age: 67
LOS: 1 days | Discharge: HOME | DRG: 176 | End: 2017-07-07
Attending: EMERGENCY MEDICINE | Admitting: INTERNAL MEDICINE
Payer: MEDICARE

## 2017-07-06 DIAGNOSIS — I26.99 OTHER ACUTE PULMONARY EMBOLISM WITHOUT ACUTE COR PULMONALE (HCC): Primary | ICD-10-CM

## 2017-07-06 LAB
ALBUMIN SERPL-MCNC: 3.8 G/DL (ref 3.5–4.8)
ALP LIVER SERPL-CCNC: 54 U/L (ref 55–142)
ALT SERPL-CCNC: 26 U/L (ref 14–54)
APTT PPP: 192.1 SECONDS (ref 25–34)
APTT PPP: 263.9 SECONDS (ref 25–34)
APTT PPP: 28.5 SECONDS (ref 25–34)
APTT PPP: >300 SECONDS (ref 25–34)
AST SERPL-CCNC: 23 U/L (ref 15–41)
BASOPHILS # BLD AUTO: 0.03 X10(3) UL (ref 0–0.1)
BASOPHILS NFR BLD AUTO: 0.6 %
BILIRUB SERPL-MCNC: 0.4 MG/DL (ref 0.1–2)
BUN BLD-MCNC: 25 MG/DL (ref 8–20)
CALCIUM BLD-MCNC: 9.4 MG/DL (ref 8.3–10.3)
CHLORIDE: 110 MMOL/L (ref 101–111)
CO2: 23 MMOL/L (ref 22–32)
CREAT BLD-MCNC: 0.96 MG/DL (ref 0.55–1.02)
D-DIMER: 3.38 UG/ML FEU (ref 0–0.49)
EOSINOPHIL # BLD AUTO: 0.06 X10(3) UL (ref 0–0.3)
EOSINOPHIL NFR BLD AUTO: 1.2 %
ERYTHROCYTE [DISTWIDTH] IN BLOOD BY AUTOMATED COUNT: 13 % (ref 11.5–16)
GLUCOSE BLD-MCNC: 94 MG/DL (ref 70–99)
HCT VFR BLD AUTO: 34.6 % (ref 34–50)
HGB BLD-MCNC: 11.6 G/DL (ref 12–16)
IMMATURE GRANULOCYTE COUNT: 0.03 X10(3) UL (ref 0–1)
IMMATURE GRANULOCYTE RATIO %: 0.6 %
INR BLD: 1.01 (ref 0.89–1.11)
LYMPHOCYTES # BLD AUTO: 1.06 X10(3) UL (ref 0.9–4)
LYMPHOCYTES NFR BLD AUTO: 21.9 %
M PROTEIN MFR SERPL ELPH: 6.9 G/DL (ref 6.1–8.3)
MCH RBC QN AUTO: 30.5 PG (ref 27–33.2)
MCHC RBC AUTO-ENTMCNC: 33.5 G/DL (ref 31–37)
MCV RBC AUTO: 91.1 FL (ref 81–100)
MONOCYTES # BLD AUTO: 0.41 X10(3) UL (ref 0.1–0.6)
MONOCYTES NFR BLD AUTO: 8.5 %
NEUTROPHIL ABS PRELIM: 3.25 X10 (3) UL (ref 1.3–6.7)
NEUTROPHILS # BLD AUTO: 3.25 X10(3) UL (ref 1.3–6.7)
NEUTROPHILS NFR BLD AUTO: 67.2 %
PLATELET # BLD AUTO: 214 10(3)UL (ref 150–450)
POTASSIUM SERPL-SCNC: 4 MMOL/L (ref 3.6–5.1)
PSA SERPL DL<=0.01 NG/ML-MCNC: 13.3 SECONDS (ref 12–14.3)
RBC # BLD AUTO: 3.8 X10(6)UL (ref 3.8–5.1)
RED CELL DISTRIBUTION WIDTH-SD: 42.4 FL (ref 35.1–46.3)
SODIUM SERPL-SCNC: 142 MMOL/L (ref 136–144)
TROPONIN: <0.046 NG/ML (ref ?–0.05)
WBC # BLD AUTO: 4.8 X10(3) UL (ref 4–13)

## 2017-07-06 PROCEDURE — 80053 COMPREHEN METABOLIC PANEL: CPT | Performed by: EMERGENCY MEDICINE

## 2017-07-06 PROCEDURE — 93306 TTE W/DOPPLER COMPLETE: CPT | Performed by: INTERNAL MEDICINE

## 2017-07-06 PROCEDURE — 84484 ASSAY OF TROPONIN QUANT: CPT | Performed by: EMERGENCY MEDICINE

## 2017-07-06 PROCEDURE — 85730 THROMBOPLASTIN TIME PARTIAL: CPT | Performed by: EMERGENCY MEDICINE

## 2017-07-06 PROCEDURE — 71275 CT ANGIOGRAPHY CHEST: CPT | Performed by: EMERGENCY MEDICINE

## 2017-07-06 PROCEDURE — 85378 FIBRIN DEGRADE SEMIQUANT: CPT | Performed by: EMERGENCY MEDICINE

## 2017-07-06 PROCEDURE — 93005 ELECTROCARDIOGRAM TRACING: CPT

## 2017-07-06 PROCEDURE — 96365 THER/PROPH/DIAG IV INF INIT: CPT

## 2017-07-06 PROCEDURE — 71010 XR CHEST AP PORTABLE  (CPT=71010): CPT | Performed by: EMERGENCY MEDICINE

## 2017-07-06 PROCEDURE — 99285 EMERGENCY DEPT VISIT HI MDM: CPT

## 2017-07-06 PROCEDURE — 85025 COMPLETE CBC W/AUTO DIFF WBC: CPT | Performed by: EMERGENCY MEDICINE

## 2017-07-06 PROCEDURE — 93010 ELECTROCARDIOGRAM REPORT: CPT

## 2017-07-06 PROCEDURE — 85730 THROMBOPLASTIN TIME PARTIAL: CPT | Performed by: INTERNAL MEDICINE

## 2017-07-06 PROCEDURE — 85610 PROTHROMBIN TIME: CPT | Performed by: EMERGENCY MEDICINE

## 2017-07-06 PROCEDURE — 94660 CPAP INITIATION&MGMT: CPT

## 2017-07-06 RX ORDER — TRAZODONE HYDROCHLORIDE 100 MG/1
50 TABLET ORAL NIGHTLY
Status: DISCONTINUED | OUTPATIENT
Start: 2017-07-06 | End: 2017-07-07

## 2017-07-06 RX ORDER — HYDROCODONE BITARTRATE AND ACETAMINOPHEN 5; 325 MG/1; MG/1
2 TABLET ORAL EVERY 4 HOURS PRN
Status: DISCONTINUED | OUTPATIENT
Start: 2017-07-06 | End: 2017-07-07

## 2017-07-06 RX ORDER — HYDROCODONE BITARTRATE AND ACETAMINOPHEN 5; 325 MG/1; MG/1
1 TABLET ORAL EVERY 4 HOURS PRN
Status: DISCONTINUED | OUTPATIENT
Start: 2017-07-06 | End: 2017-07-07

## 2017-07-06 RX ORDER — HEPARIN SODIUM 5000 [USP'U]/ML
80 INJECTION INTRAVENOUS; SUBCUTANEOUS ONCE
Status: COMPLETED | OUTPATIENT
Start: 2017-07-06 | End: 2017-07-06

## 2017-07-06 RX ORDER — LISINOPRIL AND HYDROCHLOROTHIAZIDE 20; 12.5 MG/1; MG/1
1 TABLET ORAL DAILY
Status: DISCONTINUED | OUTPATIENT
Start: 2017-07-06 | End: 2017-07-06 | Stop reason: SDUPTHER

## 2017-07-06 RX ORDER — HEPARIN SODIUM AND DEXTROSE 10000; 5 [USP'U]/100ML; G/100ML
18 INJECTION INTRAVENOUS ONCE
Status: COMPLETED | OUTPATIENT
Start: 2017-07-06 | End: 2017-07-06

## 2017-07-06 RX ORDER — HEPARIN SODIUM AND DEXTROSE 10000; 5 [USP'U]/100ML; G/100ML
INJECTION INTRAVENOUS CONTINUOUS
Status: DISCONTINUED | OUTPATIENT
Start: 2017-07-06 | End: 2017-07-07

## 2017-07-06 RX ORDER — ATORVASTATIN CALCIUM 10 MG/1
10 TABLET, FILM COATED ORAL NIGHTLY
Status: DISCONTINUED | OUTPATIENT
Start: 2017-07-06 | End: 2017-07-07

## 2017-07-06 RX ORDER — ONDANSETRON 2 MG/ML
4 INJECTION INTRAMUSCULAR; INTRAVENOUS EVERY 6 HOURS PRN
Status: DISCONTINUED | OUTPATIENT
Start: 2017-07-06 | End: 2017-07-07

## 2017-07-06 RX ORDER — SODIUM CHLORIDE 9 MG/ML
INJECTION, SOLUTION INTRAVENOUS CONTINUOUS
Status: ACTIVE | OUTPATIENT
Start: 2017-07-06 | End: 2017-07-06

## 2017-07-06 RX ORDER — ACETAMINOPHEN 325 MG/1
650 TABLET ORAL EVERY 4 HOURS PRN
Status: DISCONTINUED | OUTPATIENT
Start: 2017-07-06 | End: 2017-07-07

## 2017-07-06 NOTE — PLAN OF CARE
Patient admitted to CTU3, oriented to the unit and her room, no complaint of pain or discomfort. Admission navigator completed. Hematology consulted. Patient resting comfortably in the bed, remains closely monitored. O2 Sat in upper 90's.

## 2017-07-06 NOTE — PROGRESS NOTES
Pharmacy Note: Dietary Supplement Discontinuation Per Policy    Magnesium 015 mg has been discontinued on [address] per policy. The electrolyte protocol has been initiated for replacement.  This supplement may be restarted upon discharge using the medic

## 2017-07-06 NOTE — H&P
[facility] Hospitalist History and Physical      Patient presents with:  Chest Pain Angina (cardiovascular)       PCP: Beatrice Sanchez MD      History of Present Illness: Patient is a 78year old female with PMH sig for h/o DVT after long flight, depression, HL, HTN Other [OTHER] Sister      MVA       Review of Systems    CONSTITUTIONAL:  As per HPI. HEENT:  Eyes:  No diplopia or blurred vision. ENT:  No earache, sore throat or runny nose.   CARDIOVASCULAR:  + chest pain  RESPIRATORY:  No cough,+ shortness of breath CO2 23.0 07/06/2017   GLU 94 07/06/2017   CA 9.4 07/06/2017   ALB 3.8 07/06/2017   ALKPHO 54 07/06/2017   BILT 0.4 07/06/2017   TP 6.9 07/06/2017   AST 23 07/06/2017   ALT 26 07/06/2017   .9 07/06/2017   INR 1.01 07/06/2017   PTP 13.3 07/06/2017 Normal. PLEURA:  Normal. CHEST WALL:  Normal. LIMITED ABDOMEN:  Intrahepatic cyst with bilateral adrenal gland thickening and nodularity. BONES:  Mild diffuse degenerative change. CONCLUSION:  1. Bilateral pulmonary emboli as detailed above.   Critical Hospitalization - Initial Certification    Patient will require inpatient services that will reasonably be expected to span two midnight's based on the clinical documentation in H+P.    Based on patients current state of illness, I anticipate that, after [employer]

## 2017-07-06 NOTE — ED PROVIDER NOTES
Patient Seen in: [facility] Emergency Department    History   Patient presents with:  Chest Pain Angina (cardiovascular)    Stated Complaint: CHEST PAIN    HPI    45-year-old white female who presents emerged from today for complaint of chest pain. Take 50 mg by mouth. Lisinopril-Hydrochlorothiazide 20-12.5 MG Oral Tab,  Take 1 tablet by mouth daily. TraZODone HCl 50 MG Oral Tab,  Take 1 tabs po 1-2 hours prior to bedtime   ferrous sulfate 75 (15 FE) MG/ML Oral Solution,  Take by mouth daily. alert and appears in no acute discomfort or distress. Vital signs are stable she is afebrile    Head is normocephalic atraumatic conjunctiva is clear. Sclerae anicteric. Neck is supple. Lungs are clear to auscultation bilaterally.   Heart is regular PROTHROMBIN TIME (PT)   RAINBOW DRAW BLUE   RAINBOW DRAW GOLD   RAINBOW DRAW LAVENDER   RAINBOW DRAW LIGHT GREEN     EKG    Rate, intervals and axes as noted on EKG Report.   Rate: 66 bpm  Rhythm: Sinus Rhythm  Reading: Normal sinus rhythm without acute i and Plan     Clinical Impression:  Other acute pulmonary embolism without acute cor pulmonale (HCC)  (primary encounter diagnosis)    Disposition:  Admit    Follow-up:  No follow-up provider specified.     Medications Prescribed:  Current Discharge [facility]

## 2017-07-06 NOTE — ED INITIAL ASSESSMENT (HPI)
PT C/O CHEST TIGHTNESS AND ZENA THIS MORNING WALKING UP THE STAIRS. PT HAD SIMILAR SYMPTOMS COUPLE DAYS AGO.

## 2017-07-07 VITALS
HEIGHT: 66 IN | RESPIRATION RATE: 18 BRPM | BODY MASS INDEX: 29.51 KG/M2 | SYSTOLIC BLOOD PRESSURE: 112 MMHG | WEIGHT: 183.63 LBS | TEMPERATURE: 98 F | OXYGEN SATURATION: 100 % | HEART RATE: 57 BPM | DIASTOLIC BLOOD PRESSURE: 57 MMHG

## 2017-07-07 LAB
APTT PPP: 109.6 SECONDS (ref 25–34)
APTT PPP: 190.3 SECONDS (ref 25–34)
ATRIAL RATE: 66 BPM
BASOPHILS # BLD AUTO: 0.04 X10(3) UL (ref 0–0.1)
BASOPHILS NFR BLD AUTO: 0.6 %
BUN BLD-MCNC: 22 MG/DL (ref 8–20)
CALCIUM BLD-MCNC: 9.2 MG/DL (ref 8.3–10.3)
CHLORIDE: 107 MMOL/L (ref 101–111)
CO2: 28 MMOL/L (ref 22–32)
CREAT BLD-MCNC: 0.84 MG/DL (ref 0.55–1.02)
EOSINOPHIL # BLD AUTO: 0.14 X10(3) UL (ref 0–0.3)
EOSINOPHIL NFR BLD AUTO: 2.2 %
ERYTHROCYTE [DISTWIDTH] IN BLOOD BY AUTOMATED COUNT: 13.2 % (ref 11.5–16)
GLUCOSE BLD-MCNC: 93 MG/DL (ref 70–99)
HCT VFR BLD AUTO: 35.6 % (ref 34–50)
HGB BLD-MCNC: 11.7 G/DL (ref 12–16)
IMMATURE GRANULOCYTE COUNT: 0.04 X10(3) UL (ref 0–1)
IMMATURE GRANULOCYTE RATIO %: 0.6 %
LYMPHOCYTES # BLD AUTO: 1.88 X10(3) UL (ref 0.9–4)
LYMPHOCYTES NFR BLD AUTO: 30.2 %
MCH RBC QN AUTO: 30.5 PG (ref 27–33.2)
MCHC RBC AUTO-ENTMCNC: 32.9 G/DL (ref 31–37)
MCV RBC AUTO: 92.7 FL (ref 81–100)
MONOCYTES # BLD AUTO: 0.49 X10(3) UL (ref 0.1–0.6)
MONOCYTES NFR BLD AUTO: 7.9 %
NEUTROPHIL ABS PRELIM: 3.64 X10 (3) UL (ref 1.3–6.7)
NEUTROPHILS # BLD AUTO: 3.64 X10(3) UL (ref 1.3–6.7)
NEUTROPHILS NFR BLD AUTO: 58.5 %
P AXIS: 24 DEGREES
P-R INTERVAL: 148 MS
PLATELET # BLD AUTO: 221 10(3)UL (ref 150–450)
POTASSIUM SERPL-SCNC: 4.6 MMOL/L (ref 3.6–5.1)
Q-T INTERVAL: 402 MS
QRS DURATION: 84 MS
QTC CALCULATION (BEZET): 421 MS
R AXIS: -4 DEGREES
RBC # BLD AUTO: 3.84 X10(6)UL (ref 3.8–5.1)
RED CELL DISTRIBUTION WIDTH-SD: 44.8 FL (ref 35.1–46.3)
SODIUM SERPL-SCNC: 140 MMOL/L (ref 136–144)
T AXIS: 36 DEGREES
VENTRICULAR RATE: 66 BPM
WBC # BLD AUTO: 6.2 X10(3) UL (ref 4–13)

## 2017-07-07 PROCEDURE — 80048 BASIC METABOLIC PNL TOTAL CA: CPT | Performed by: INTERNAL MEDICINE

## 2017-07-07 PROCEDURE — 85730 THROMBOPLASTIN TIME PARTIAL: CPT | Performed by: INTERNAL MEDICINE

## 2017-07-07 PROCEDURE — 85025 COMPLETE CBC W/AUTO DIFF WBC: CPT | Performed by: INTERNAL MEDICINE

## 2017-07-07 NOTE — CM/SW NOTE
RX called in to pt pharmacy [facility] [phone]. The price for the first three weeks of Xarelto will be $27.30. The remainder of the script will be $39.00.   Pt updated and is ok with the cost.

## 2017-07-07 NOTE — PROGRESS NOTES
[facility] Hospitalist Progress Note                                                                   [address] [facility]  3/20/1950    SUBJECTIVE: pt denies SOB. satting well.  Chest pain is minimal pain.      SOB- likely 2/2 PE - likely provoked by surgery/sedentary lifestyle  -satting ok on RA  -Chest xr clear, Dimer elevated, CTA + for bl PE  -started on heparin gtt  -TTE reviewed no RV strain  -heme on consult for [facility] recs, likely xarelto indefinit

## 2017-07-07 NOTE — RESPIRATORY THERAPY NOTE
FRANCI - Equipment Use Daily Summary:                  . Set Mode: AUTO CPAP WITH C-FLEX                . Usage in hours: 7:51                . 90% Pressure (EPAP) level: 7.6                . 90% Insp. Pressure (IPAP): Alex Vargas  AHI: 1.9

## 2017-07-07 NOTE — CONSULTS
[facility]  Report of Consultation    [facility] Patient Status:  Inpatient    3/20/1950 MRN [id number]   [facility] 3NE-A Attending Amy Perez, DO   Hosp Day # 1 PCP Lonnie Singer MD     REASON FOR CONSULTATION:     Recurrent v Age of Onset   • Cancer Father      lung   • Heart Disorder Mother      CAD, stent, and valvular   • Other [OTHER] Sister      MVA      reports that she has never smoked. She has never used smokeless tobacco. She reports that she drinks alcohol.  She report rate and rhythm. Abdomen: Soft, non tender with good bowel sounds. Extremities: Pedal pulses are present. No edema. Neurological: Grossly intact. Lymphatics:  There is no palpable lymphadenopathy         DIAGNOSTIC WORK UP:     Laboratory Data: sclerotic cataract of both eyes     Primary osteoarthritis of right hip     Bilateral hip pain     PVD (posterior vitreous detachment), bilateral     Wrist sprain, left, initial encounter     Osteoarthritis of right hip     Osteoarthritis right hip  Global

## 2017-07-07 NOTE — PAYOR COMM NOTE
--------------  ADMISSION REVIEW       7/6  ED       Stated Complaint: CHEST PAIN          44-year-old white female who presents emerged from today for complaint of chest pain.     Patient reports that over the last couple days she has noticed when she walk - Abnormal; Notable for the following:      D-Dimer 3.38 (*)       All other components within normal limits     Narrative:      FEU = Fibrinogen Equivalent Units.     D-Dimer results of less than 0.5 ug/mL (FEU) have been shown to contribute to the exclus

## 2017-08-21 PROBLEM — H90.A21 SENSORINEURAL HEARING LOSS (SNHL) OF RIGHT EAR WITH RESTRICTED HEARING OF LEFT EAR: Status: ACTIVE | Noted: 2017-08-21

## 2017-08-22 PROBLEM — M20.11 HALLUX VALGUS, RIGHT: Status: ACTIVE | Noted: 2017-08-22

## 2017-08-22 PROBLEM — M20.42 HAMMER TOE OF LEFT FOOT: Status: RESOLVED | Noted: 2017-04-19 | Resolved: 2017-08-22

## 2017-11-18 ENCOUNTER — APPOINTMENT (OUTPATIENT)
Dept: GENERAL RADIOLOGY | Facility: HOSPITAL | Age: 67
End: 2017-11-18
Attending: EMERGENCY MEDICINE
Payer: MEDICARE

## 2017-11-18 ENCOUNTER — HOSPITAL ENCOUNTER (EMERGENCY)
Facility: HOSPITAL | Age: 67
Discharge: HOME | End: 2017-11-19
Attending: EMERGENCY MEDICINE
Payer: MEDICARE

## 2017-11-18 VITALS
DIASTOLIC BLOOD PRESSURE: 79 MMHG | SYSTOLIC BLOOD PRESSURE: 166 MMHG | HEART RATE: 78 BPM | HEIGHT: 66 IN | RESPIRATION RATE: 16 BRPM | BODY MASS INDEX: 29.73 KG/M2 | OXYGEN SATURATION: 97 % | TEMPERATURE: 98 F | WEIGHT: 185 LBS

## 2017-11-18 DIAGNOSIS — S43.014A ANTERIOR DISLOCATION OF RIGHT SHOULDER, INITIAL ENCOUNTER: Primary | ICD-10-CM

## 2017-11-18 PROCEDURE — 23650 CLTX SHO DSLC W/MNPJ WO ANES: CPT

## 2017-11-18 PROCEDURE — 73030 X-RAY EXAM OF SHOULDER: CPT | Performed by: EMERGENCY MEDICINE

## 2017-11-18 PROCEDURE — 96376 TX/PRO/DX INJ SAME DRUG ADON: CPT

## 2017-11-18 PROCEDURE — 99284 EMERGENCY DEPT VISIT MOD MDM: CPT

## 2017-11-18 PROCEDURE — 96375 TX/PRO/DX INJ NEW DRUG ADDON: CPT

## 2017-11-18 PROCEDURE — 73060 X-RAY EXAM OF HUMERUS: CPT | Performed by: EMERGENCY MEDICINE

## 2017-11-18 PROCEDURE — 96374 THER/PROPH/DIAG INJ IV PUSH: CPT

## 2017-11-18 PROCEDURE — 73080 X-RAY EXAM OF ELBOW: CPT | Performed by: EMERGENCY MEDICINE

## 2017-11-18 RX ORDER — ONDANSETRON 2 MG/ML
4 INJECTION INTRAMUSCULAR; INTRAVENOUS ONCE
Status: DISCONTINUED | OUTPATIENT
Start: 2017-11-18 | End: 2017-11-19

## 2017-11-18 RX ORDER — ONDANSETRON 2 MG/ML
4 INJECTION INTRAMUSCULAR; INTRAVENOUS ONCE
Status: COMPLETED | OUTPATIENT
Start: 2017-11-18 | End: 2017-11-18

## 2017-11-18 RX ORDER — HYDROMORPHONE HYDROCHLORIDE 1 MG/ML
0.5 INJECTION, SOLUTION INTRAMUSCULAR; INTRAVENOUS; SUBCUTANEOUS EVERY 30 MIN PRN
Status: DISCONTINUED | OUTPATIENT
Start: 2017-11-18 | End: 2017-11-19

## 2017-11-19 RX ORDER — HYDROCODONE BITARTRATE AND ACETAMINOPHEN 5; 325 MG/1; MG/1
1 TABLET ORAL EVERY 4 HOURS PRN
Qty: 20 TABLET | Refills: 0 | Status: SHIPPED | OUTPATIENT
Start: 2017-11-19 | End: 2017-11-29

## 2017-11-19 NOTE — ED PROVIDER NOTES
Patient Seen in: [facility] Emergency Department    History   Patient presents with:  Upper Extremity Injury (musculoskeletal)  Fall (musculoskeletal, neurologic)    Stated Complaint: right arm injury s/p fall    HPI    Tereza Rivas is a pleasant 67-year-o 168/84  Pulse: 85  Resp: 26  Temp: 97.9 °F (36.6 °C)  Temp src: Oral  SpO2: 100 %  O2 Device: None (Room air)    Current:BP (!) 166/79   Pulse 78   Temp 97.9 °F (36.6 °C) (Oral)   Resp 16   Ht 167.6 cm (5' 6\")   Wt 83.9 kg   LMP 02/16/1997   SpO2 97%   BM

## 2017-11-19 NOTE — ED INITIAL ASSESSMENT (HPI)
C/o R upper arm pain, states she fell down 2-3 steps coming down the stairs, \"I was carrying something, not sure if I missed a step, landed on my R side. \" Pt denies LOC

## 2017-11-27 PROBLEM — M24.319: Status: ACTIVE | Noted: 2017-11-27

## 2017-12-20 PROBLEM — S43.004D SHOULDER DISLOCATION, RIGHT, SUBSEQUENT ENCOUNTER: Status: ACTIVE | Noted: 2017-12-20

## 2018-04-05 PROBLEM — F32.4 MAJOR DEPRESSIVE DISORDER IN PARTIAL REMISSION (HCC): Status: ACTIVE | Noted: 2018-04-05

## 2018-08-10 ENCOUNTER — APPOINTMENT (OUTPATIENT)
Dept: GENERAL RADIOLOGY | Facility: HOSPITAL | Age: 68
End: 2018-08-10
Payer: MEDICARE

## 2018-08-10 ENCOUNTER — HOSPITAL ENCOUNTER (OUTPATIENT)
Facility: HOSPITAL | Age: 68
Setting detail: OBSERVATION
Discharge: HOME | End: 2018-08-11
Attending: EMERGENCY MEDICINE | Admitting: INTERNAL MEDICINE
Payer: MEDICARE

## 2018-08-10 ENCOUNTER — APPOINTMENT (OUTPATIENT)
Dept: CT IMAGING | Facility: HOSPITAL | Age: 68
End: 2018-08-10
Attending: EMERGENCY MEDICINE
Payer: MEDICARE

## 2018-08-10 ENCOUNTER — APPOINTMENT (OUTPATIENT)
Dept: ULTRASOUND IMAGING | Facility: HOSPITAL | Age: 68
End: 2018-08-10
Attending: EMERGENCY MEDICINE
Payer: MEDICARE

## 2018-08-10 DIAGNOSIS — R07.9 CHEST PAIN OF UNCERTAIN ETIOLOGY: Primary | ICD-10-CM

## 2018-08-10 PROBLEM — R73.9 HYPERGLYCEMIA: Status: ACTIVE | Noted: 2018-08-10

## 2018-08-10 LAB
ALBUMIN SERPL-MCNC: 4.3 G/DL (ref 3.5–4.8)
ALBUMIN/GLOB SERPL: 1.3 {RATIO} (ref 1–2)
ALP LIVER SERPL-CCNC: 48 U/L (ref 55–142)
ALT SERPL-CCNC: 30 U/L (ref 14–54)
ANION GAP SERPL CALC-SCNC: 9 MMOL/L (ref 0–18)
APTT PPP: 43.5 SECONDS (ref 26.1–34.6)
AST SERPL-CCNC: 39 U/L (ref 15–41)
ATRIAL RATE: 60 BPM
BASOPHILS # BLD AUTO: 0.02 X10(3) UL (ref 0–0.1)
BASOPHILS NFR BLD AUTO: 0.4 %
BILIRUB SERPL-MCNC: 0.6 MG/DL (ref 0.1–2)
BUN BLD-MCNC: 18 MG/DL (ref 8–20)
BUN/CREAT SERPL: 19.8 (ref 10–20)
CALCIUM BLD-MCNC: 9.9 MG/DL (ref 8.3–10.3)
CHLORIDE SERPL-SCNC: 102 MMOL/L (ref 101–111)
CK SERPL-CCNC: 201 IU/L (ref 26–192)
CO2 SERPL-SCNC: 25 MMOL/L (ref 22–32)
CREAT BLD-MCNC: 0.91 MG/DL (ref 0.55–1.02)
D-DIMER: 0.65 UG/ML FEU (ref 0–0.49)
EOSINOPHIL # BLD AUTO: 0.03 X10(3) UL (ref 0–0.3)
EOSINOPHIL NFR BLD AUTO: 0.6 %
ERYTHROCYTE [DISTWIDTH] IN BLOOD BY AUTOMATED COUNT: 12.4 % (ref 11.5–16)
GLOBULIN PLAS-MCNC: 3.4 G/DL (ref 2.5–3.7)
GLUCOSE BLD-MCNC: 103 MG/DL (ref 70–99)
HCT VFR BLD AUTO: 38.4 % (ref 34–50)
HGB BLD-MCNC: 13.2 G/DL (ref 12–16)
IMMATURE GRANULOCYTE COUNT: 0.02 X10(3) UL (ref 0–1)
IMMATURE GRANULOCYTE RATIO %: 0.4 %
INR BLD: 2.69 (ref 0.9–1.1)
LYMPHOCYTES # BLD AUTO: 1.51 X10(3) UL (ref 0.9–4)
LYMPHOCYTES NFR BLD AUTO: 28.9 %
M PROTEIN MFR SERPL ELPH: 7.7 G/DL (ref 6.1–8.3)
MCH RBC QN AUTO: 31.9 PG (ref 27–33.2)
MCHC RBC AUTO-ENTMCNC: 34.4 G/DL (ref 31–37)
MCV RBC AUTO: 92.8 FL (ref 81–100)
MONOCYTES # BLD AUTO: 0.48 X10(3) UL (ref 0.1–1)
MONOCYTES NFR BLD AUTO: 9.2 %
NEUTROPHIL ABS PRELIM: 3.16 X10 (3) UL (ref 1.3–6.7)
NEUTROPHILS # BLD AUTO: 3.16 X10(3) UL (ref 1.3–6.7)
NEUTROPHILS NFR BLD AUTO: 60.5 %
OSMOLALITY SERPL CALC.SUM OF ELEC: 284 MOSM/KG (ref 275–295)
P AXIS: 13 DEGREES
P-R INTERVAL: 154 MS
PLATELET # BLD AUTO: 231 10(3)UL (ref 150–450)
POTASSIUM SERPL-SCNC: 4.2 MMOL/L (ref 3.6–5.1)
PRO-BETA NATRIURETIC PEPTIDE: 47 PG/ML (ref ?–125)
PSA SERPL DL<=0.01 NG/ML-MCNC: 29.5 SECONDS (ref 12.4–14.7)
Q-T INTERVAL: 426 MS
QRS DURATION: 82 MS
QTC CALCULATION (BEZET): 426 MS
R AXIS: -17 DEGREES
RBC # BLD AUTO: 4.14 X10(6)UL (ref 3.8–5.1)
RED CELL DISTRIBUTION WIDTH-SD: 41.4 FL (ref 35.1–46.3)
SODIUM SERPL-SCNC: 136 MMOL/L (ref 136–144)
T AXIS: 25 DEGREES
TROPONIN I SERPL-MCNC: <0.046 NG/ML (ref ?–0.05)
TROPONIN I SERPL-MCNC: <0.046 NG/ML (ref ?–0.05)
VENTRICULAR RATE: 60 BPM
WBC # BLD AUTO: 5.2 X10(3) UL (ref 4–13)

## 2018-08-10 PROCEDURE — 84484 ASSAY OF TROPONIN QUANT: CPT

## 2018-08-10 PROCEDURE — 82550 ASSAY OF CK (CPK): CPT | Performed by: INTERNAL MEDICINE

## 2018-08-10 PROCEDURE — 85025 COMPLETE CBC W/AUTO DIFF WBC: CPT | Performed by: EMERGENCY MEDICINE

## 2018-08-10 PROCEDURE — 80053 COMPREHEN METABOLIC PANEL: CPT

## 2018-08-10 PROCEDURE — 36415 COLL VENOUS BLD VENIPUNCTURE: CPT

## 2018-08-10 PROCEDURE — 85730 THROMBOPLASTIN TIME PARTIAL: CPT | Performed by: EMERGENCY MEDICINE

## 2018-08-10 PROCEDURE — 85610 PROTHROMBIN TIME: CPT | Performed by: EMERGENCY MEDICINE

## 2018-08-10 PROCEDURE — 71275 CT ANGIOGRAPHY CHEST: CPT | Performed by: EMERGENCY MEDICINE

## 2018-08-10 PROCEDURE — 93971 EXTREMITY STUDY: CPT | Performed by: EMERGENCY MEDICINE

## 2018-08-10 PROCEDURE — 93005 ELECTROCARDIOGRAM TRACING: CPT

## 2018-08-10 PROCEDURE — 99285 EMERGENCY DEPT VISIT HI MDM: CPT

## 2018-08-10 PROCEDURE — 80053 COMPREHEN METABOLIC PANEL: CPT | Performed by: EMERGENCY MEDICINE

## 2018-08-10 PROCEDURE — 83880 ASSAY OF NATRIURETIC PEPTIDE: CPT | Performed by: EMERGENCY MEDICINE

## 2018-08-10 PROCEDURE — 94660 CPAP INITIATION&MGMT: CPT

## 2018-08-10 PROCEDURE — 85378 FIBRIN DEGRADE SEMIQUANT: CPT | Performed by: EMERGENCY MEDICINE

## 2018-08-10 PROCEDURE — 93010 ELECTROCARDIOGRAM REPORT: CPT

## 2018-08-10 PROCEDURE — 84484 ASSAY OF TROPONIN QUANT: CPT | Performed by: INTERNAL MEDICINE

## 2018-08-10 PROCEDURE — 71045 X-RAY EXAM CHEST 1 VIEW: CPT

## 2018-08-10 PROCEDURE — 84484 ASSAY OF TROPONIN QUANT: CPT | Performed by: EMERGENCY MEDICINE

## 2018-08-10 PROCEDURE — 85025 COMPLETE CBC W/AUTO DIFF WBC: CPT

## 2018-08-10 RX ORDER — NITROGLYCERIN 0.4 MG/1
0.4 TABLET SUBLINGUAL ONCE
Status: COMPLETED | OUTPATIENT
Start: 2018-08-10 | End: 2018-08-10

## 2018-08-10 RX ORDER — SENNOSIDES 8.6 MG
1300 CAPSULE ORAL 2 TIMES DAILY
Status: ON HOLD | COMMUNITY
End: 2018-10-19

## 2018-08-10 RX ORDER — ATORVASTATIN CALCIUM 10 MG/1
10 TABLET, FILM COATED ORAL NIGHTLY
Status: DISCONTINUED | OUTPATIENT
Start: 2018-08-10 | End: 2018-08-11

## 2018-08-10 RX ORDER — TRAZODONE HYDROCHLORIDE 50 MG/1
50 TABLET ORAL NIGHTLY
COMMUNITY
End: 2018-08-16

## 2018-08-10 RX ORDER — MULTIVIT-MIN/IRON FUM/FOLIC AC 7.5 MG-4
1 TABLET ORAL DAILY
COMMUNITY

## 2018-08-10 RX ORDER — MAGNESIUM HYDROXIDE/ALUMINUM HYDROXICE/SIMETHICONE 120; 1200; 1200 MG/30ML; MG/30ML; MG/30ML
30 SUSPENSION ORAL ONCE
Status: COMPLETED | OUTPATIENT
Start: 2018-08-10 | End: 2018-08-10

## 2018-08-10 RX ORDER — AZELASTINE 1 MG/ML
1 SPRAY, METERED NASAL 2 TIMES DAILY
Status: DISCONTINUED | OUTPATIENT
Start: 2018-08-10 | End: 2018-08-11

## 2018-08-10 RX ORDER — ASPIRIN 81 MG/1
81 TABLET ORAL DAILY
Status: DISCONTINUED | OUTPATIENT
Start: 2018-08-10 | End: 2018-08-11

## 2018-08-10 RX ORDER — ASPIRIN 81 MG/1
324 TABLET, CHEWABLE ORAL ONCE
Status: COMPLETED | OUTPATIENT
Start: 2018-08-10 | End: 2018-08-10

## 2018-08-10 RX ORDER — FEXOFENADINE HCL 180 MG/1
180 TABLET ORAL DAILY
COMMUNITY
End: 2020-02-24 | Stop reason: ALTCHOICE

## 2018-08-10 RX ORDER — CETIRIZINE HYDROCHLORIDE 10 MG/1
10 TABLET ORAL DAILY
Status: DISCONTINUED | OUTPATIENT
Start: 2018-08-11 | End: 2018-08-11

## 2018-08-10 RX ORDER — MULTIPLE VITAMINS W/ MINERALS TAB 9MG-400MCG
1 TAB ORAL DAILY
Status: DISCONTINUED | OUTPATIENT
Start: 2018-08-11 | End: 2018-08-11

## 2018-08-10 RX ORDER — SIMVASTATIN 20 MG
20 TABLET ORAL NIGHTLY
COMMUNITY
End: 2019-03-18

## 2018-08-10 RX ORDER — TRAZODONE HYDROCHLORIDE 50 MG/1
50 TABLET ORAL NIGHTLY
Status: DISCONTINUED | OUTPATIENT
Start: 2018-08-10 | End: 2018-08-11

## 2018-08-10 RX ORDER — MULTIVITAMIN/IRON/FOLIC ACID 18MG-0.4MG
250 TABLET ORAL DAILY
Status: DISCONTINUED | OUTPATIENT
Start: 2018-08-11 | End: 2018-08-11

## 2018-08-10 RX ORDER — LISINOPRIL 20 MG/1
20 TABLET ORAL DAILY
Status: DISCONTINUED | OUTPATIENT
Start: 2018-08-11 | End: 2018-08-11

## 2018-08-10 NOTE — ED PROVIDER NOTES
Patient Seen in: [facility] Emergency Department    History   Patient presents with:  Dyspnea ZENA SOB (respiratory)    Stated Complaint: SOB, chest tightness, hx PE    HPI    Patient is a 52-year-old female who has a history of pulmonary embolism. Smoker                                                              Smokeless tobacco: Never Used                      Alcohol use: Yes           0.0 oz/week     Comment: rarely - 1 per month      Review of Systems    Positive for stated complaint: MANE ch other components within normal limits   D-DIMER - Abnormal; Notable for the following:     D-Dimer 0.65 (*)     All other components within normal limits    Narrative:     FEU = Fibrinogen Equivalent Units.     D-Dimer results of less than 0.5 ug/mL (FEU) h diagnosis)    Disposition:  Admit  8/10/2018  4:45 pm    Follow-up:  No follow-up provider specified.       Medications Prescribed:  Current Discharge Medication List        Present on Admission  Date Reviewed: 5/21/2018          ICD-10-CM Noted POA    * (P

## 2018-08-10 NOTE — CONSULTS
[address] [place] [address] Cardiology  Report of Consultation    Joanne Vazquez Patient Status:  Observation    3/20/1950 MRN [id number]   [facility] 8NE-A Attending Eloise Guadalupe MD   Hosp Day # 0 PCP Marlee Osler, MD     [facility] for [facility] SERVICES, [facility] ([facility]) Allergies    Review of Systems:   No fevers, chills, change in weight or bowel habits. Ten point review of systems is otherwise negative or unremarkable.     Physical Exam:    08/10/18  1628   BP: 137/78   Pulse: 59   Resp: 14   Temp:      Wt Readings from 08/10/18   1223   TROP  <0.046       Diagnostics:   Tele:  EKG:   Echo:   Stress Test:   Cath:   CTA Chest:   CXR:       Assessment:  1.   Chest discomfort   -Most c/w musculoskeletal   -Reproducible on exam   -No acute ischemic ECG change   -Negative initi

## 2018-08-10 NOTE — ED INITIAL ASSESSMENT (HPI)
Pt states yesterday after working out in the yard felt SOB and chest tightness that has not gone away. Pt hx PE, last year.

## 2018-08-10 NOTE — ED NOTES
Pt aware of plan of care, pt appears comfortable. Pt states discomfort/pressure to chest is actually worse after nitro. MD made aware.

## 2018-08-11 ENCOUNTER — APPOINTMENT (OUTPATIENT)
Dept: CV DIAGNOSTICS | Facility: HOSPITAL | Age: 68
End: 2018-08-11
Attending: INTERNAL MEDICINE
Payer: MEDICARE

## 2018-08-11 VITALS
TEMPERATURE: 98 F | OXYGEN SATURATION: 97 % | WEIGHT: 190 LBS | RESPIRATION RATE: 21 BRPM | HEART RATE: 83 BPM | BODY MASS INDEX: 30.53 KG/M2 | SYSTOLIC BLOOD PRESSURE: 122 MMHG | DIASTOLIC BLOOD PRESSURE: 54 MMHG | HEIGHT: 66 IN

## 2018-08-11 LAB
ANION GAP SERPL CALC-SCNC: 5 MMOL/L (ref 0–18)
BUN BLD-MCNC: 18 MG/DL (ref 8–20)
BUN/CREAT SERPL: 22.5 (ref 10–20)
CALCIUM BLD-MCNC: 9.1 MG/DL (ref 8.3–10.3)
CHLORIDE SERPL-SCNC: 103 MMOL/L (ref 101–111)
CO2 SERPL-SCNC: 28 MMOL/L (ref 22–32)
CREAT BLD-MCNC: 0.8 MG/DL (ref 0.55–1.02)
GLUCOSE BLD-MCNC: 92 MG/DL (ref 70–99)
OSMOLALITY SERPL CALC.SUM OF ELEC: 284 MOSM/KG (ref 275–295)
POTASSIUM SERPL-SCNC: 3.9 MMOL/L (ref 3.6–5.1)
SODIUM SERPL-SCNC: 136 MMOL/L (ref 136–144)
TROPONIN I SERPL-MCNC: <0.046 NG/ML (ref ?–0.05)

## 2018-08-11 PROCEDURE — 80048 BASIC METABOLIC PNL TOTAL CA: CPT | Performed by: HOSPITALIST

## 2018-08-11 PROCEDURE — 93017 CV STRESS TEST TRACING ONLY: CPT | Performed by: INTERNAL MEDICINE

## 2018-08-11 PROCEDURE — 93350 STRESS TTE ONLY: CPT | Performed by: INTERNAL MEDICINE

## 2018-08-11 PROCEDURE — 84484 ASSAY OF TROPONIN QUANT: CPT | Performed by: HOSPITALIST

## 2018-08-11 PROCEDURE — 93018 CV STRESS TEST I&R ONLY: CPT | Performed by: INTERNAL MEDICINE

## 2018-08-11 RX ORDER — ACETAMINOPHEN 500 MG
500 TABLET ORAL EVERY 6 HOURS PRN
Status: DISCONTINUED | OUTPATIENT
Start: 2018-08-11 | End: 2018-08-11

## 2018-08-11 NOTE — DISCHARGE SUMMARY
General Medicine Discharge Summary     Patient ID:  Deirdre Naranjo  80year old  3/20/1950    Admit date: 8/10/2018    Discharge date and time: 8/11/2018  2:33 PM     Attending Physician: Shanthi att. providers trop  -stress echo negative for ischmia     Hx of PE: no acute PE on CT chest        HTN lisinopril, monitor BP     Pulmonary nodule on CT chest; follow up with PCP  Stable hypodense lesions in the liver; follow up with PCP    Consults: [facility]  [phone]

## 2018-08-11 NOTE — PROGRESS NOTES
Informed by Dr. Dasha Reyna stress test is negative, notified Dr. Carlita Hoyt. IV and tele discontinued. Pt. Received discharge instructions and follow-up information. Questions addressed and answered. Pt.  To be transported by wheelchair with tech to d/c transport

## 2018-08-11 NOTE — RESPIRATORY THERAPY NOTE
FRANCI - Equipment Use Daily Summary:                  . Set Mode: AUTO CPAP WITH C-FLEX                . Usage in hours: 7:33                . 90% Pressure (EPAP) level: 9.3                . 90% Insp. Pressure (IPAP): [place] Master  AHI: 4.3

## 2018-08-11 NOTE — PROGRESS NOTES
[facility] Cardiology  Progress Note    Vito Stubbs Patient Status:  Observation    3/20/1950 MRN [id number]   [facility] 8NE-A Attending Nito Hernández MD   Hosp Day # 0 PCP Tae Pierre MD     Subjective:   No chest pain of an is normal S1, S2. There is no S3 or S4. There are no murmurs, rubs, or gallops. No click is appreciated. PMI is nondisplaced with a normal apical impulse. Pulmonary:  Lungs are clear to auscultation bilaterally.   There are no focal rales, rhonchi, o

## 2018-08-11 NOTE — PROGRESS NOTES
CARDIODIAGNOSTICS PRELIMINARY REPORT    Stress Echocardiogram    Patient exercised for 9:01 minutes on a Homer protocol. Peak HR was 137 (90% of her APMHR). Denied any chest discomfort. Rare isolated PVC's noted.   IV definity was used to enhance echo

## 2018-08-11 NOTE — H&P
[facility] hospitalist H+P    PCP; Carson Salas MD  CC chest pain  HPI 81 yo female with multiple medical problems including but not limited to PE, FRANCI, HTN developed chest discomfort and SOB.  Patient was recently gardening and yesterday developed midsternal chest Take 1 tablet by mouth daily. Disp: 90 tablet Rfl: 3   Rivaroxaban (XARELTO) 20 MG Oral Tab Take 1 tablet (20 mg total) by mouth daily with food. Disp: 30 tablet Rfl: 11   CRANBERRY OR Take  by mouth daily.  Disp:  Rfl:    Magnesium 100 MG Oral Tab Take 100

## 2018-08-11 NOTE — PLAN OF CARE
Patient/Family Goals    • Patient/Family Long Term Goal Progressing    • Patient/Family Short Term Goal Progressing          HX: HTN, PVD, depression, DVT/PE- on xarelto, FRANCI- Cpap- own roberth and tubing    Patient came in with cp \"feelslike when I had a PE

## 2018-08-21 PROBLEM — M16.12 PRIMARY OSTEOARTHRITIS OF LEFT HIP: Status: ACTIVE | Noted: 2018-08-21

## 2018-08-21 PROBLEM — Z86.711 PERSONAL HISTORY OF PE (PULMONARY EMBOLISM): Status: ACTIVE | Noted: 2018-08-21

## 2018-08-21 PROBLEM — M25.552 LEFT HIP PAIN: Status: ACTIVE | Noted: 2018-08-21

## 2018-10-08 NOTE — H&P
[address]    PATIENT'S NAME: Maged Bennett   ATTENDING PHYSICIAN: Trinidad Nettles M.D.    PATIENT ACCOUNT#:   [de-identified]    LOCATION:    MEDICAL RECORD #:   [id number]       YOB: 1950  ADMISSION DATE:       10/19/2018    HISTORY AND PHY pathological dislocation of shoulder, hyperglycemia, chest pain, pulmonary embolism, hearing impairment, she uses a right hearing aid, visual impairment, contacts, eyeglasses.     PAST SURGICAL HISTORY:  Left foot surgery, right hip replacement in 2017, rig M.D.  d: 10/06/2018 08:12:58  t: 10/06/2018 11:03:37  [facility] [id number]  KF/    cc: DINA Henderson M.D.

## 2018-10-10 ENCOUNTER — LABORATORY ENCOUNTER (OUTPATIENT)
Dept: LAB | Age: 68
End: 2018-10-10
Attending: ORTHOPAEDIC SURGERY
Payer: MEDICARE

## 2018-10-10 DIAGNOSIS — M16.12 PRIMARY OSTEOARTHRITIS OF LEFT HIP: ICD-10-CM

## 2018-10-10 PROCEDURE — 86850 RBC ANTIBODY SCREEN: CPT

## 2018-10-10 PROCEDURE — 87081 CULTURE SCREEN ONLY: CPT

## 2018-10-10 PROCEDURE — 86900 BLOOD TYPING SEROLOGIC ABO: CPT

## 2018-10-10 PROCEDURE — 86901 BLOOD TYPING SEROLOGIC RH(D): CPT

## 2018-10-18 RX ORDER — TRAZODONE HYDROCHLORIDE 50 MG/1
50 TABLET ORAL NIGHTLY
COMMUNITY
End: 2019-02-04

## 2018-10-19 ENCOUNTER — HOSPITAL ENCOUNTER (INPATIENT)
Facility: HOSPITAL | Age: 68
LOS: 2 days | Discharge: HOME HEALTH | DRG: 470 | End: 2018-10-21
Attending: ORTHOPAEDIC SURGERY | Admitting: ORTHOPAEDIC SURGERY
Payer: MEDICARE

## 2018-10-19 ENCOUNTER — ANESTHESIA (OUTPATIENT)
Dept: SURGERY | Facility: HOSPITAL | Age: 68
DRG: 470 | End: 2018-10-19
Payer: MEDICARE

## 2018-10-19 ENCOUNTER — APPOINTMENT (OUTPATIENT)
Dept: GENERAL RADIOLOGY | Facility: HOSPITAL | Age: 68
DRG: 470 | End: 2018-10-19
Attending: ORTHOPAEDIC SURGERY
Payer: MEDICARE

## 2018-10-19 ENCOUNTER — ANESTHESIA EVENT (OUTPATIENT)
Dept: SURGERY | Facility: HOSPITAL | Age: 68
DRG: 470 | End: 2018-10-19
Payer: MEDICARE

## 2018-10-19 DIAGNOSIS — M16.12 PRIMARY OSTEOARTHRITIS OF LEFT HIP: Primary | ICD-10-CM

## 2018-10-19 PROCEDURE — 5A09357 ASSISTANCE WITH RESPIRATORY VENTILATION, LESS THAN 24 CONSECUTIVE HOURS, CONTINUOUS POSITIVE AIRWAY PRESSURE: ICD-10-PCS | Performed by: ORTHOPAEDIC SURGERY

## 2018-10-19 PROCEDURE — 76942 ECHO GUIDE FOR BIOPSY: CPT | Performed by: ORTHOPAEDIC SURGERY

## 2018-10-19 PROCEDURE — 88304 TISSUE EXAM BY PATHOLOGIST: CPT | Performed by: ORTHOPAEDIC SURGERY

## 2018-10-19 PROCEDURE — 97161 PT EVAL LOW COMPLEX 20 MIN: CPT

## 2018-10-19 PROCEDURE — 3E0T3BZ INTRODUCTION OF ANESTHETIC AGENT INTO PERIPHERAL NERVES AND PLEXI, PERCUTANEOUS APPROACH: ICD-10-PCS | Performed by: ANESTHESIOLOGY

## 2018-10-19 PROCEDURE — 88311 DECALCIFY TISSUE: CPT | Performed by: ORTHOPAEDIC SURGERY

## 2018-10-19 PROCEDURE — 94660 CPAP INITIATION&MGMT: CPT

## 2018-10-19 PROCEDURE — 0SRB0JA REPLACEMENT OF LEFT HIP JOINT WITH SYNTHETIC SUBSTITUTE, UNCEMENTED, OPEN APPROACH: ICD-10-PCS | Performed by: ORTHOPAEDIC SURGERY

## 2018-10-19 PROCEDURE — 73501 X-RAY EXAM HIP UNI 1 VIEW: CPT | Performed by: ORTHOPAEDIC SURGERY

## 2018-10-19 PROCEDURE — 97116 GAIT TRAINING THERAPY: CPT

## 2018-10-19 DEVICE — MOD CUP NEU LNR LG 50/52/54X36: Type: IMPLANTABLE DEVICE | Site: HIP | Status: FUNCTIONAL

## 2018-10-19 DEVICE — BIOLOX® DELTA, CERAMIC FEMORAL HEAD, S, Ø 36/-3.5, TAPER 12/14
Type: IMPLANTABLE DEVICE | Site: HIP | Status: FUNCTIONAL
Brand: BIOLOX® DELTA

## 2018-10-19 DEVICE — BONE SCREW 6.5X25 SELF-TAP: Type: IMPLANTABLE DEVICE | Site: HIP | Status: FUNCTIONAL

## 2018-10-19 DEVICE — M/L TAP 7.5 STD REDUCE NECK LN: Type: IMPLANTABLE DEVICE | Site: HIP | Status: FUNCTIONAL

## 2018-10-19 DEVICE — TM MOD CUP 52MM CLUSTER: Type: IMPLANTABLE DEVICE | Site: HIP | Status: FUNCTIONAL

## 2018-10-19 DEVICE — BONE SCREW 6.5X40 SELF-TAP: Type: IMPLANTABLE DEVICE | Site: HIP | Status: FUNCTIONAL

## 2018-10-19 RX ORDER — DEXAMETHASONE SODIUM PHOSPHATE 4 MG/ML
4 VIAL (ML) INJECTION AS NEEDED
Status: DISCONTINUED | OUTPATIENT
Start: 2018-10-19 | End: 2018-10-19 | Stop reason: HOSPADM

## 2018-10-19 RX ORDER — CEFAZOLIN SODIUM/WATER 2 G/20 ML
2 SYRINGE (ML) INTRAVENOUS ONCE
Status: DISCONTINUED | OUTPATIENT
Start: 2018-10-19 | End: 2018-10-19 | Stop reason: HOSPADM

## 2018-10-19 RX ORDER — OXYCODONE HYDROCHLORIDE 5 MG/1
5 TABLET ORAL EVERY 4 HOURS PRN
Status: DISCONTINUED | OUTPATIENT
Start: 2018-10-19 | End: 2018-10-20

## 2018-10-19 RX ORDER — ACETAMINOPHEN 325 MG/1
TABLET ORAL
Status: COMPLETED
Start: 2018-10-19 | End: 2018-10-19

## 2018-10-19 RX ORDER — DIPHENHYDRAMINE HYDROCHLORIDE 50 MG/ML
12.5 INJECTION INTRAMUSCULAR; INTRAVENOUS EVERY 4 HOURS PRN
Status: DISCONTINUED | OUTPATIENT
Start: 2018-10-19 | End: 2018-10-21

## 2018-10-19 RX ORDER — HYDROCODONE BITARTRATE AND ACETAMINOPHEN 10; 325 MG/1; MG/1
1 TABLET ORAL EVERY 6 HOURS PRN
Qty: 60 TABLET | Refills: 0 | Status: SHIPPED | OUTPATIENT
Start: 2018-10-19 | End: 2018-10-19

## 2018-10-19 RX ORDER — TIZANIDINE 2 MG/1
2 TABLET ORAL 3 TIMES DAILY PRN
Status: DISCONTINUED | OUTPATIENT
Start: 2018-10-19 | End: 2018-10-21

## 2018-10-19 RX ORDER — BISACODYL 10 MG
10 SUPPOSITORY, RECTAL RECTAL
Status: DISCONTINUED | OUTPATIENT
Start: 2018-10-19 | End: 2018-10-21

## 2018-10-19 RX ORDER — MORPHINE SULFATE 4 MG/ML
2 INJECTION, SOLUTION INTRAMUSCULAR; INTRAVENOUS EVERY 2 HOUR PRN
Status: DISCONTINUED | OUTPATIENT
Start: 2018-10-19 | End: 2018-10-21

## 2018-10-19 RX ORDER — KETOROLAC TROMETHAMINE 15 MG/ML
15 INJECTION, SOLUTION INTRAMUSCULAR; INTRAVENOUS EVERY 6 HOURS
Status: COMPLETED | OUTPATIENT
Start: 2018-10-19 | End: 2018-10-20

## 2018-10-19 RX ORDER — LISINOPRIL AND HYDROCHLOROTHIAZIDE 20; 12.5 MG/1; MG/1
1 TABLET ORAL DAILY
Status: DISCONTINUED | OUTPATIENT
Start: 2018-10-20 | End: 2018-10-19

## 2018-10-19 RX ORDER — DIPHENHYDRAMINE HCL 25 MG
25 CAPSULE ORAL EVERY 4 HOURS PRN
Status: DISCONTINUED | OUTPATIENT
Start: 2018-10-19 | End: 2018-10-21

## 2018-10-19 RX ORDER — SODIUM PHOSPHATE, DIBASIC AND SODIUM PHOSPHATE, MONOBASIC 7; 19 G/133ML; G/133ML
1 ENEMA RECTAL ONCE AS NEEDED
Status: DISCONTINUED | OUTPATIENT
Start: 2018-10-19 | End: 2018-10-21

## 2018-10-19 RX ORDER — MORPHINE SULFATE 4 MG/ML
2 INJECTION, SOLUTION INTRAMUSCULAR; INTRAVENOUS EVERY 5 MIN PRN
Status: DISCONTINUED | OUTPATIENT
Start: 2018-10-19 | End: 2018-10-19 | Stop reason: HOSPADM

## 2018-10-19 RX ORDER — OXYCODONE HYDROCHLORIDE 15 MG/1
15 TABLET ORAL EVERY 4 HOURS PRN
Status: DISCONTINUED | OUTPATIENT
Start: 2018-10-19 | End: 2018-10-20

## 2018-10-19 RX ORDER — DOCUSATE SODIUM 100 MG/1
100 CAPSULE, LIQUID FILLED ORAL 2 TIMES DAILY
Status: DISCONTINUED | OUTPATIENT
Start: 2018-10-19 | End: 2018-10-21

## 2018-10-19 RX ORDER — ACETAMINOPHEN 500 MG
1000 TABLET ORAL ONCE
Status: DISCONTINUED | OUTPATIENT
Start: 2018-10-19 | End: 2018-10-19 | Stop reason: HOSPADM

## 2018-10-19 RX ORDER — SENNOSIDES 8.6 MG
17.2 TABLET ORAL NIGHTLY
Status: DISCONTINUED | OUTPATIENT
Start: 2018-10-19 | End: 2018-10-21

## 2018-10-19 RX ORDER — MIDAZOLAM HYDROCHLORIDE 1 MG/ML
1 INJECTION INTRAMUSCULAR; INTRAVENOUS EVERY 5 MIN PRN
Status: DISCONTINUED | OUTPATIENT
Start: 2018-10-19 | End: 2018-10-19 | Stop reason: HOSPADM

## 2018-10-19 RX ORDER — MORPHINE SULFATE 4 MG/ML
1 INJECTION, SOLUTION INTRAMUSCULAR; INTRAVENOUS EVERY 2 HOUR PRN
Status: DISCONTINUED | OUTPATIENT
Start: 2018-10-19 | End: 2018-10-21

## 2018-10-19 RX ORDER — SODIUM CHLORIDE, SODIUM LACTATE, POTASSIUM CHLORIDE, CALCIUM CHLORIDE 600; 310; 30; 20 MG/100ML; MG/100ML; MG/100ML; MG/100ML
INJECTION, SOLUTION INTRAVENOUS CONTINUOUS
Status: DISCONTINUED | OUTPATIENT
Start: 2018-10-19 | End: 2018-10-21

## 2018-10-19 RX ORDER — NALOXONE HYDROCHLORIDE 0.4 MG/ML
80 INJECTION, SOLUTION INTRAMUSCULAR; INTRAVENOUS; SUBCUTANEOUS AS NEEDED
Status: DISCONTINUED | OUTPATIENT
Start: 2018-10-19 | End: 2018-10-19 | Stop reason: HOSPADM

## 2018-10-19 RX ORDER — CETIRIZINE HYDROCHLORIDE 10 MG/1
10 TABLET ORAL DAILY
Status: DISCONTINUED | OUTPATIENT
Start: 2018-10-20 | End: 2018-10-21

## 2018-10-19 RX ORDER — MORPHINE SULFATE 4 MG/ML
INJECTION, SOLUTION INTRAMUSCULAR; INTRAVENOUS
Status: COMPLETED
Start: 2018-10-19 | End: 2018-10-19

## 2018-10-19 RX ORDER — OXYCODONE HYDROCHLORIDE 10 MG/1
10 TABLET ORAL EVERY 4 HOURS PRN
Status: DISCONTINUED | OUTPATIENT
Start: 2018-10-19 | End: 2018-10-20

## 2018-10-19 RX ORDER — ONDANSETRON 2 MG/ML
4 INJECTION INTRAMUSCULAR; INTRAVENOUS EVERY 4 HOURS PRN
Status: DISPENSED | OUTPATIENT
Start: 2018-10-19 | End: 2018-10-21

## 2018-10-19 RX ORDER — CEFAZOLIN SODIUM/WATER 2 G/20 ML
2 SYRINGE (ML) INTRAVENOUS EVERY 8 HOURS
Status: COMPLETED | OUTPATIENT
Start: 2018-10-19 | End: 2018-10-20

## 2018-10-19 RX ORDER — ATORVASTATIN CALCIUM 10 MG/1
10 TABLET, FILM COATED ORAL NIGHTLY
Status: DISCONTINUED | OUTPATIENT
Start: 2018-10-19 | End: 2018-10-21

## 2018-10-19 RX ORDER — ACETAMINOPHEN 325 MG/1
650 TABLET ORAL 4 TIMES DAILY
Status: DISCONTINUED | OUTPATIENT
Start: 2018-10-19 | End: 2018-10-20

## 2018-10-19 RX ORDER — ACETAMINOPHEN 325 MG/1
650 TABLET ORAL ONCE
Status: COMPLETED | OUTPATIENT
Start: 2018-10-19 | End: 2018-10-19

## 2018-10-19 RX ORDER — MORPHINE SULFATE 4 MG/ML
4 INJECTION, SOLUTION INTRAMUSCULAR; INTRAVENOUS EVERY 2 HOUR PRN
Status: DISCONTINUED | OUTPATIENT
Start: 2018-10-19 | End: 2018-10-21

## 2018-10-19 RX ORDER — POLYETHYLENE GLYCOL 3350 17 G/17G
17 POWDER, FOR SOLUTION ORAL DAILY PRN
Status: DISCONTINUED | OUTPATIENT
Start: 2018-10-19 | End: 2018-10-21

## 2018-10-19 RX ORDER — MEPERIDINE HYDROCHLORIDE 25 MG/ML
12.5 INJECTION INTRAMUSCULAR; INTRAVENOUS; SUBCUTANEOUS AS NEEDED
Status: DISCONTINUED | OUTPATIENT
Start: 2018-10-19 | End: 2018-10-19 | Stop reason: HOSPADM

## 2018-10-19 RX ORDER — DIPHENHYDRAMINE HYDROCHLORIDE 50 MG/ML
25 INJECTION INTRAMUSCULAR; INTRAVENOUS ONCE AS NEEDED
Status: ACTIVE | OUTPATIENT
Start: 2018-10-19 | End: 2018-10-19

## 2018-10-19 RX ORDER — ONDANSETRON 2 MG/ML
4 INJECTION INTRAMUSCULAR; INTRAVENOUS AS NEEDED
Status: DISCONTINUED | OUTPATIENT
Start: 2018-10-19 | End: 2018-10-19 | Stop reason: HOSPADM

## 2018-10-19 RX ORDER — HYDROCODONE BITARTRATE AND ACETAMINOPHEN 10; 325 MG/1; MG/1
1 TABLET ORAL EVERY 6 HOURS PRN
Qty: 60 TABLET | Refills: 0 | Status: SHIPPED | OUTPATIENT
Start: 2018-10-19 | End: 2018-12-27 | Stop reason: ALTCHOICE

## 2018-10-19 RX ORDER — METOCLOPRAMIDE HYDROCHLORIDE 5 MG/ML
10 INJECTION INTRAMUSCULAR; INTRAVENOUS EVERY 6 HOURS PRN
Status: ACTIVE | OUTPATIENT
Start: 2018-10-19 | End: 2018-10-21

## 2018-10-19 RX ORDER — SODIUM CHLORIDE, SODIUM LACTATE, POTASSIUM CHLORIDE, CALCIUM CHLORIDE 600; 310; 30; 20 MG/100ML; MG/100ML; MG/100ML; MG/100ML
INJECTION, SOLUTION INTRAVENOUS CONTINUOUS
Status: DISCONTINUED | OUTPATIENT
Start: 2018-10-19 | End: 2018-10-19 | Stop reason: HOSPADM

## 2018-10-19 RX ORDER — AZELASTINE 1 MG/ML
1 SPRAY, METERED NASAL 2 TIMES DAILY
Status: DISCONTINUED | OUTPATIENT
Start: 2018-10-19 | End: 2018-10-21

## 2018-10-19 NOTE — BRIEF OP NOTE
Pre-Operative Diagnosis: Primary osteoarthritis of left hip [M16.12]     Post-Operative Diagnosis: * No post-op diagnosis entered *      Procedure Performed:   Procedure(s):  LEFT TOTAL HIP ARTHROPLASTY    Surgeon(s) and Role:     Osorio Kearns MD - Pr

## 2018-10-19 NOTE — PHYSICAL THERAPY NOTE
PHYSICAL THERAPY HIP EVALUATION - INPATIENT     Room Number: 356/356-A  Evaluation Date: 10/19/2018  Type of Evaluation: Initial  Physician Order: PT Eval and Treat    Presenting Problem: s/p L posterior MAYTE 10/19/18  Reason for Therapy: Mobility Dysfuncti Performed by Judeth Dakins, DPM at [address] (1) TOE Left 5/15/2017    Performed by Judeth Dakins, DPM at [address]   •   [phone]   • CALCIUM SCORE, CARDIO (DMG)      zero   • COLONOSCOP RESTRICTION  Weight Bearing Restriction: None                PAIN ASSESSMENT  Ratin(4 beginning, inc to 6 with bed mobility, dec to 4 s/p gait)  Location: incisional site  Management Techniques:  Activity promotion;Repositioning    COGNITION  · Overall ABILITY STATUS  Gait Assessment   Gait Assistance: Dependent assistance(Actual CGA)  Distance (ft): 20  Assistive Device: Rolling walker  Pattern: L Decreased stance time(decreased L knee flexion, L antalgic)  Stoop/Curb Assistance: Not tested  Comment : B presents with the following impairments static and dynamic seated and standing balance, pain, transfers, gait, stair navigation, and knowledge of hip precautions. Functional outcome measures completed include AMPAC.   Based on this evaluation, patient's cl

## 2018-10-19 NOTE — ANESTHESIA PREPROCEDURE EVALUATION
PRE-OP EVALUATION    Patient Name: Barbara Slater    Pre-op Diagnosis: Primary osteoarthritis of left hip [M16.12]    Procedure(s):  LEFT TOTAL HIP ARTHROPLASTY    Surgeon(s) and Role:     Pooja Wang MD - Primary    Pre-op vitals reviewed.   Temp: 98 Disp:  Rfl:        Allergies: Patient has no known allergies. Anesthesia Evaluation    Patient summary reviewed.     Anesthetic Complications  (-) history of anesthetic complications         GI/Hepatic/Renal      (+) GERD                           Card MD IRAIS at [facility] MAIN OR   • KNEE REPLACEMENT SURGERY  2010    L knee    (mochel)   • KNEE REPLACEMENT SURGERY  12/4/15    right TKR   • KNEE TOTAL REPLACEMENT Right 12/4/2015    Performed by Marquise Longoria MD at [facility] MAIN OR   • OSTEOTOMY / [address] Left 5/15

## 2018-10-19 NOTE — CONSULTS
[facility] hospitalist initial consult note  PCP Km Pitt MD  Consulted at the request of Dr. Montez Needs  Reason for consult medical co-management    HPI 81 yo female with multiple medical problems including but not limited to DVT/PE, HTN, HL, FRANCI, Depression, OA h REPLACEMENT SURGERY  2010    L knee    (mochel)   • KNEE REPLACEMENT SURGERY  12/4/15    right TKR   • KNEE TOTAL REPLACEMENT Right 12/4/2015    Performed by Bozena Calderón MD at [address] Left 5/15/2017    Performed by Laurence Pickering to Encounter:  Acetaminophen (ACETAMINOPHEN EXTRA STRENGTH) 500 MG Oral Cap Take 1,000 mg by mouth one time. Disp:  Rfl:    Sertraline HCl 50 MG Oral Tab Take 50 mg by mouth daily. Disp:  Rfl:    TraZODone HCl 50 MG Oral Tab Take 50 mg by mouth nightly.  1 ordered    Preventative pt on xarelto as outpatient    Cheryl Mejia MD  [facility] hospitalist  [phone]

## 2018-10-19 NOTE — INTERVAL H&P NOTE
Pre-op Diagnosis: Primary osteoarthritis of left hip [M16.12]    The above referenced H&P was reviewed by ISAAC Mane on 10/19/2018, the patient was examined and no significant changes have occurred in the patient's condition since the H&P was perf

## 2018-10-20 PROCEDURE — 97535 SELF CARE MNGMENT TRAINING: CPT

## 2018-10-20 PROCEDURE — 97150 GROUP THERAPEUTIC PROCEDURES: CPT

## 2018-10-20 PROCEDURE — 97116 GAIT TRAINING THERAPY: CPT

## 2018-10-20 PROCEDURE — 80048 BASIC METABOLIC PNL TOTAL CA: CPT | Performed by: HOSPITALIST

## 2018-10-20 PROCEDURE — 97165 OT EVAL LOW COMPLEX 30 MIN: CPT

## 2018-10-20 PROCEDURE — 85027 COMPLETE CBC AUTOMATED: CPT | Performed by: PHYSICIAN ASSISTANT

## 2018-10-20 RX ORDER — TRAZODONE HYDROCHLORIDE 50 MG/1
50 TABLET ORAL NIGHTLY
Status: DISCONTINUED | OUTPATIENT
Start: 2018-10-20 | End: 2018-10-21

## 2018-10-20 RX ORDER — HYDROCODONE BITARTRATE AND ACETAMINOPHEN 10; 325 MG/1; MG/1
2 TABLET ORAL EVERY 4 HOURS PRN
Status: DISCONTINUED | OUTPATIENT
Start: 2018-10-20 | End: 2018-10-21

## 2018-10-20 RX ORDER — ACETAMINOPHEN 325 MG/1
325 TABLET ORAL EVERY 4 HOURS PRN
Status: DISCONTINUED | OUTPATIENT
Start: 2018-10-20 | End: 2018-10-21

## 2018-10-20 RX ORDER — HYDROCODONE BITARTRATE AND ACETAMINOPHEN 10; 325 MG/1; MG/1
1 TABLET ORAL EVERY 4 HOURS PRN
Status: DISCONTINUED | OUTPATIENT
Start: 2018-10-20 | End: 2018-10-21

## 2018-10-20 RX ORDER — ACETAMINOPHEN 325 MG/1
650 TABLET ORAL EVERY 4 HOURS PRN
Status: DISCONTINUED | OUTPATIENT
Start: 2018-10-20 | End: 2018-10-21

## 2018-10-20 NOTE — PROGRESS NOTES
[facility] hospitalist daily note  Seen/examined on 10/20/18    S: no chest pain, no SOB< no abd pain, no nausea, + passing gas  Patient denies bleeding    Medications in Epic    PE;    10/20/18  1600   BP: 115/58   Pulse: 60   Resp: 20   Temp: 98.3 °F (36.8 °C)

## 2018-10-20 NOTE — OPERATIVE REPORT
[address]    PATIENT'S NAME: Maged Bennett   ATTENDING PHYSICIAN: Liudmila Alvarez M.D. OPERATING PHYSICIAN: Liudmila Alvarez M.D.    PATIENT ACCOUNT#:   [de-identified]    LOCATION:  [address] [place] [address]  MEDICAL RECORD #:   [id number]       DATE OF BIRTH:  [birth year] sharp scalpel was used to incise the fascia fabrice and the fascia of the gluteus minh, then the fibers of the gluteus minh were gently teased apart with a gloved digit. A self-retaining Charnley retractor was positioned.   The hip was placed in marked placed down the  hole. The lateralizer was used to enlarge the  hole laterally. An anteversion osteotome was used to remove bone from the proximal metaphysis.   I began broaching with a size 4 broach, and I broached up to a size 7.5 broach, at w 36 mm head. I reduced the hip. I had excellent stability in full extension and external rotation, as well as full flexion, adduction, and internal rotation. Limb length was restored, and offset was quite stable.   Although I used a different neck length neurovascularly intact. Dictated By Yuval Johnson M.D.  d: 10/19/2018 20:28:09  t: 10/19/2018 11:50:86  Job [id number]  [facility]/    cc: DINA Henry M.D.

## 2018-10-20 NOTE — PROGRESS NOTES
[facility]  Progress Note    Sumi Coello Patient Status:  Inpatient    3/20/1950 MRN [id number]   [facility] 3SW-A Attending Luciana Jones MD   Hosp Day # 1 PCP Jeanne Guzman MD     Subjective:POD #1   S/P THR   No complaints.  Doing Other acute pulmonary embolism without acute cor pulmonale (HCC)     Sensorineural hearing loss (SNHL) of right ear with restricted hearing of left ear     Hallux valgus, right     Pathological dislocation of shoulder region, unspecified laterality     Sh

## 2018-10-20 NOTE — RESPIRATORY THERAPY NOTE
FRANCI - Equipment Use Daily Summary:  · Set Mode AFLEX  · Usage in hours: 8:00  · 90% Pressure (EPAP) level: 6.5  · 90% Insp Pressure (IPAP):   · AHI: 1.9  · Supplemental Oxygen:   · Comments:

## 2018-10-20 NOTE — PHYSICAL THERAPY NOTE
PHYSICAL THERAPY HIP TREATMENT NOTE - INPATIENT      Room Number: 356/356-A     Session: POD 1 sessions 1 & 2  Number of Visits to Meet Established Goals: 3    Presenting Problem: s/p L posterior MAYTE 10/19/18    Problem List  Active Problems:    * No activ REPLACEMENT SURGERY  12/4/15    right TKR   • KNEE TOTAL REPLACEMENT Right 12/4/2015    Performed by Mushtaq Flores MD at [place] [address] / [place] [address] Left 5/15/2017    Performed by Shonda Duvall DPM at [facility] tested  Comment : Based on FIM definitions    Skilled Therapy Provided: AM:  Pt agreeable to group therapy. Pt motivated and able to complete 300 ft gait training with RW at [address] [place] [address] with a steady and safe gait pattern-step through.   Pt completed all seated a Bed mobility; Endurance; Energy conservation;Patient education; Family education;Gait training;Neuromuscular re-educate;Range of motion;Strengthening;Stair training;Transfer training;Balance training  Rehab Potential : Good  Frequency (Obs): 5x/week    JENNIFER

## 2018-10-20 NOTE — HOME CARE LIAISON
Met with pt at the bedside. Agreeable to [facility] services. Brochure and liaison card provided. Any pt questions addressed. Will follow.

## 2018-10-20 NOTE — OCCUPATIONAL THERAPY NOTE
OCCUPATIONAL THERAPY QUICK EVALUATION - INPATIENT    Room Number: 356/356-A  Evaluation Date: 10/20/2018     Type of Evaluation: Quick Eval  Presenting Problem:  L MAYTE    Physician Order: IP Consult to Occupational Therapy  Reason for Therapy:  ADL/IADL Dy POLYPECTOMY 86229 N/A 11/10/2012    Performed by Gudelia Stubbs MD at [address] [facility]   • [facility]  11/10/2012    Procedure: COLONOSCOPY, POSSIBLE BIOPSY, POSSIBLE POLYPECTOMY 42027;  Surgeon:  Gudelia Stubbs MD;  Location:  Room Air at Rest: 96  SPO2 Ambulation on Room Air: [address] Sh[address] Carlos ‘6-Clicks’ Inpatient Daily Activity Short Form   How much help from another person does the patient currently need…  -   Putting on and taking off patient  is demonstrating a  0% degree impairment in activities of daily living. Research supports that patients with this level of impairment often benefit from home with family assist as needed for limited endurance post-op.    Patient seen for OT Evaluat

## 2018-10-21 VITALS
OXYGEN SATURATION: 98 % | RESPIRATION RATE: 18 BRPM | HEART RATE: 73 BPM | WEIGHT: 200.63 LBS | HEIGHT: 66 IN | SYSTOLIC BLOOD PRESSURE: 122 MMHG | BODY MASS INDEX: 32.24 KG/M2 | DIASTOLIC BLOOD PRESSURE: 54 MMHG | TEMPERATURE: 98 F

## 2018-10-21 PROCEDURE — 97150 GROUP THERAPEUTIC PROCEDURES: CPT

## 2018-10-21 PROCEDURE — 97116 GAIT TRAINING THERAPY: CPT

## 2018-10-21 PROCEDURE — 85027 COMPLETE CBC AUTOMATED: CPT | Performed by: PHYSICIAN ASSISTANT

## 2018-10-21 NOTE — PHYSICAL THERAPY NOTE
PHYSICAL THERAPY HIP TREATMENT NOTE - INPATIENT      Room Number: 356/356-A     Session: 3  Number of Visits to Meet Established Goals: 3    Presenting Problem: s/p L posterior MAYTE 10/19/18    Problem List  Active Problems:    * No active hospital problems 12/4/15    right TKR   • KNEE TOTAL REPLACEMENT Right 12/4/2015    Performed by Swati Lin MD at [facility]  Left 5/15/2017    Performed by Amisha Leiva DPM at [address]  \"I am more stiff. exercises with decreased assistance to perform seated and standing exercises. All questions answered and cues given to dec substitutions. Pt ambulated 300' with RW and mod ind. Pt demonstrated improved step through pattern.  Stair training completed with us modified independent  MET   Goal #4     Patient will negotiate 4 stairs/one curb w/ assistive device and supervision MET   Goal #5     Patient verbalizes and/or demonstrates all precautions and safety concerns independently MET   Goal #6           Goal Com

## 2018-10-21 NOTE — CM/SW NOTE
10/21/18 0900   CM/SW Referral Data   Referral Source Physician   Reason for Referral Discharge planning;Psychoscial assessment   Informant Patient   Patient Info   Patient's Mental Status Alert;Oriented   Patient's [address]   Patient live

## 2018-10-21 NOTE — PROGRESS NOTES
[facility]  Progress Note    Francoise Skelton Patient Status:  Inpatient    3/20/1950 MRN [id number]   [facility] 3SW-A Attending Swathi Pena MD   Hosp Day # 2 PCP Mao Birmingham MD     Subjective:POD #2   S/P Left THR  Patient is doing Capsule Tendon Balancing 2nd & 3rd, Skin Plasty Left- Sx 05/15/17 TATA 8/13/17     Pulmonary embolism without acute cor pulmonale (HCC)     Other acute pulmonary embolism without acute cor pulmonale (HCC)     Sensorineural hearing loss (SNHL) of right ear w

## 2018-10-21 NOTE — DISCHARGE SUMMARY
Discharge Summary  Patient ID:  Yovanny Catherine  [id number]  80year old  3/20/1950    Admit date: 10/19/2018    Discharge date and time: 10/21/18    Attending Physician: No att. providers found     Reason for admission: Primary osteoarthritis of left hip [M1

## 2018-10-21 NOTE — PROGRESS NOTES
NURSING DISCHARGE NOTE    Discharged Home via private vehicle ( driving). Accompanied by Spouse  Belongings Taken by patient/family. Norco rx given to patient.

## 2018-10-21 NOTE — PROGRESS NOTES
Acute Pain Service    Post Op Day 2 Ortho Note    Assessed patient in chair. Patient rates pain pain was 9/10 earlier this morning but pt received Norco which brought pain down to a 2-3/10.  Patient states pain is well managed with medications; denies itchi

## 2018-10-21 NOTE — PROGRESS NOTES
[facility] hospitalist daily note  Seen/examined on 10/21/18     S: patient had episode of low Bp this am after getting Norco and Zanaflex.  Now Bp back to normal. Patient worked with Physicla therapy and did not have dizziness, no chest pain, no SOB, no abd pain,

## 2018-10-21 NOTE — RESPIRATORY THERAPY NOTE
FRANCI - Equipment Use Daily Summary:  · Set Mode AFLEX  · Usage in hours: 8:48  · 90% Pressure (EPAP) level: 10.0  · 90% Insp Pressure (IPAP):   · AHI: 1.4  · Supplemental Oxygen:   · Comments:

## 2018-10-29 PROBLEM — I26.02 SADDLE EMBOLUS OF PULMONARY ARTERY WITH ACUTE COR PULMONALE, UNSPECIFIED CHRONICITY (HCC): Status: ACTIVE | Noted: 2018-10-29

## 2018-10-29 PROBLEM — Z86.711 PERSONAL HISTORY OF PE (PULMONARY EMBOLISM): Status: RESOLVED | Noted: 2018-08-21 | Resolved: 2018-10-29

## 2019-01-16 NOTE — ED NOTES
Pt given boxed lunch, updated on POC. Resting comfortably at this time.    Pt status unchanged. Pt appears comfortable and in no distress.

## 2019-03-15 PROBLEM — I70.0 ATHEROSCLEROSIS OF AORTA (HCC): Status: ACTIVE | Noted: 2019-03-15

## 2019-07-03 PROBLEM — I26.02 SADDLE EMBOLUS OF PULMONARY ARTERY WITH ACUTE COR PULMONALE, UNSPECIFIED CHRONICITY (HCC): Status: RESOLVED | Noted: 2018-10-29 | Resolved: 2019-07-03

## 2019-07-03 PROCEDURE — 87086 URINE CULTURE/COLONY COUNT: CPT | Performed by: FAMILY MEDICINE

## 2020-02-24 PROBLEM — M16.11 OSTEOARTHRITIS OF RIGHT HIP: Status: RESOLVED | Noted: 2017-02-24 | Resolved: 2020-02-24

## 2020-05-17 PROBLEM — Z47.89 ORTHOPEDIC AFTERCARE: Status: RESOLVED | Noted: 2017-02-27 | Resolved: 2020-05-17

## 2020-05-17 PROBLEM — Z47.89 AFTERCARE FOLLOWING SURGERY OF THE MUSCULOSKELETAL SYSTEM: Status: RESOLVED | Noted: 2017-05-23 | Resolved: 2020-05-17

## 2020-05-17 PROBLEM — R07.9 CHEST PAIN OF UNCERTAIN ETIOLOGY: Status: RESOLVED | Noted: 2018-08-10 | Resolved: 2020-05-17

## 2020-05-17 PROBLEM — M20.11 HALLUX VALGUS, RIGHT: Status: RESOLVED | Noted: 2017-08-22 | Resolved: 2020-05-17

## 2020-05-17 PROBLEM — M24.319: Status: RESOLVED | Noted: 2017-11-27 | Resolved: 2020-05-17

## 2020-05-17 PROBLEM — S43.004D SHOULDER DISLOCATION, RIGHT, SUBSEQUENT ENCOUNTER: Status: RESOLVED | Noted: 2017-12-20 | Resolved: 2020-05-17

## 2020-05-17 PROBLEM — M25.552 LEFT HIP PAIN: Status: RESOLVED | Noted: 2018-08-21 | Resolved: 2020-05-17

## 2020-05-17 PROBLEM — S93.145A: Status: RESOLVED | Noted: 2017-04-19 | Resolved: 2020-05-17

## 2020-05-17 PROBLEM — M16.12 PRIMARY OSTEOARTHRITIS OF LEFT HIP: Status: RESOLVED | Noted: 2018-08-21 | Resolved: 2020-05-17

## 2020-07-20 RX ORDER — SODIUM CHLORIDE, SODIUM LACTATE, POTASSIUM CHLORIDE, CALCIUM CHLORIDE 600; 310; 30; 20 MG/100ML; MG/100ML; MG/100ML; MG/100ML
INJECTION, SOLUTION INTRAVENOUS CONTINUOUS
Status: CANCELLED | OUTPATIENT
Start: 2020-07-20

## 2020-07-20 RX ORDER — TITANIUM DIOXIDE, OCTINOXATE, ZINC OXIDE 4.61; 1.6; .78 G/40ML; G/40ML; G/40ML
CREAM TOPICAL
COMMUNITY
End: 2020-07-20 | Stop reason: ALTCHOICE

## 2020-07-20 RX ORDER — THIAMINE HCL 100 MG
TABLET ORAL
COMMUNITY

## 2020-07-20 RX ORDER — ACETAMINOPHEN 500 MG
1000 TABLET ORAL ONCE
Status: CANCELLED | OUTPATIENT
Start: 2020-07-20 | End: 2020-07-20

## 2020-07-28 ENCOUNTER — LAB ENCOUNTER (OUTPATIENT)
Dept: LAB | Facility: HOSPITAL | Age: 70
End: 2020-07-28
Attending: ORTHOPAEDIC SURGERY
Payer: MEDICARE

## 2020-07-28 DIAGNOSIS — M19.012 ARTHRITIS OF LEFT SHOULDER REGION: ICD-10-CM

## 2020-07-29 LAB — SARS-COV-2 RNA RESP QL NAA+PROBE: NOT DETECTED

## 2020-07-30 NOTE — H&P
[facility]    PATIENT'S NAME: Maged Bennett   ATTENDING PHYSICIAN: Joe Zamorano M.D.    PATIENT ACCOUNT#:   [de-identified]    LOCATION:    MEDICAL RECORD #:   [id number]       YOB: 1950  ADMISSION DATE:       07/31/2020    HISTORY AND PHY glenohumeral joint. She was offered a shoulder replacement. The patient's surgery was originally cancelled because of the pandemic and her 's surgery.   She is now scheduled to undergo a left shoulder replacement at her request at [facility] o has never used smokeless tobacco.  She does drink alcohol, less than 1 ounce per week. She denies illicit drug use. REVIEW OF SYSTEMS:  Negative.       PHYSICAL EXAMINATION:    GENERAL:  The patient is 5 feet 7 inches and weighs 200 pounds for a BMI of

## 2020-07-31 ENCOUNTER — ANESTHESIA (OUTPATIENT)
Dept: SURGERY | Facility: HOSPITAL | Age: 70
End: 2020-07-31
Payer: MEDICARE

## 2020-07-31 ENCOUNTER — APPOINTMENT (OUTPATIENT)
Dept: GENERAL RADIOLOGY | Facility: HOSPITAL | Age: 70
End: 2020-07-31
Attending: ORTHOPAEDIC SURGERY
Payer: MEDICARE

## 2020-07-31 ENCOUNTER — HOSPITAL ENCOUNTER (OUTPATIENT)
Facility: HOSPITAL | Age: 70
Discharge: HOME HEALTH | End: 2020-08-01
Attending: ORTHOPAEDIC SURGERY | Admitting: ORTHOPAEDIC SURGERY
Payer: MEDICARE

## 2020-07-31 ENCOUNTER — ANESTHESIA EVENT (OUTPATIENT)
Dept: SURGERY | Facility: HOSPITAL | Age: 70
End: 2020-07-31
Payer: MEDICARE

## 2020-07-31 DIAGNOSIS — M19.012 ARTHRITIS OF LEFT SHOULDER REGION: Primary | ICD-10-CM

## 2020-07-31 LAB
ANTIBODY SCREEN: NEGATIVE
RH BLOOD TYPE: POSITIVE

## 2020-07-31 PROCEDURE — 86850 RBC ANTIBODY SCREEN: CPT

## 2020-07-31 PROCEDURE — 86901 BLOOD TYPING SEROLOGIC RH(D): CPT

## 2020-07-31 PROCEDURE — 88305 TISSUE EXAM BY PATHOLOGIST: CPT | Performed by: ORTHOPAEDIC SURGERY

## 2020-07-31 PROCEDURE — 76942 ECHO GUIDE FOR BIOPSY: CPT | Performed by: STUDENT IN AN ORGANIZED HEALTH CARE EDUCATION/TRAINING PROGRAM

## 2020-07-31 PROCEDURE — 88311 DECALCIFY TISSUE: CPT | Performed by: ORTHOPAEDIC SURGERY

## 2020-07-31 PROCEDURE — 94660 CPAP INITIATION&MGMT: CPT

## 2020-07-31 PROCEDURE — 86900 BLOOD TYPING SEROLOGIC ABO: CPT

## 2020-07-31 PROCEDURE — 0RRK00Z REPLACEMENT OF LEFT SHOULDER JOINT WITH REVERSE BALL AND SOCKET SYNTHETIC SUBSTITUTE, OPEN APPROACH: ICD-10-PCS | Performed by: ORTHOPAEDIC SURGERY

## 2020-07-31 PROCEDURE — 73020 X-RAY EXAM OF SHOULDER: CPT | Performed by: ORTHOPAEDIC SURGERY

## 2020-07-31 PROCEDURE — 3E0T3BZ INTRODUCTION OF ANESTHETIC AGENT INTO PERIPHERAL NERVES AND PLEXI, PERCUTANEOUS APPROACH: ICD-10-PCS | Performed by: STUDENT IN AN ORGANIZED HEALTH CARE EDUCATION/TRAINING PROGRAM

## 2020-07-31 RX ORDER — ATORVASTATIN CALCIUM 10 MG/1
10 TABLET, FILM COATED ORAL NIGHTLY
Status: DISCONTINUED | OUTPATIENT
Start: 2020-07-31 | End: 2020-08-01

## 2020-07-31 RX ORDER — DIPHENHYDRAMINE HYDROCHLORIDE 50 MG/ML
25 INJECTION INTRAMUSCULAR; INTRAVENOUS ONCE AS NEEDED
Status: ACTIVE | OUTPATIENT
Start: 2020-07-31 | End: 2020-07-31

## 2020-07-31 RX ORDER — ONDANSETRON 2 MG/ML
INJECTION INTRAMUSCULAR; INTRAVENOUS AS NEEDED
Status: DISCONTINUED | OUTPATIENT
Start: 2020-07-31 | End: 2020-07-31 | Stop reason: SURG

## 2020-07-31 RX ORDER — MIDAZOLAM HYDROCHLORIDE 1 MG/ML
1 INJECTION INTRAMUSCULAR; INTRAVENOUS EVERY 5 MIN PRN
Status: DISCONTINUED | OUTPATIENT
Start: 2020-07-31 | End: 2020-07-31 | Stop reason: HOSPADM

## 2020-07-31 RX ORDER — ACETAMINOPHEN 500 MG
1000 TABLET ORAL ONCE
Status: DISCONTINUED | OUTPATIENT
Start: 2020-07-31 | End: 2020-07-31

## 2020-07-31 RX ORDER — VANCOMYCIN HYDROCHLORIDE
15 ONCE
Status: COMPLETED | OUTPATIENT
Start: 2020-07-31 | End: 2020-07-31

## 2020-07-31 RX ORDER — AZELASTINE 1 MG/ML
1 SPRAY, METERED NASAL 2 TIMES DAILY
Status: DISCONTINUED | OUTPATIENT
Start: 2020-07-31 | End: 2020-08-01

## 2020-07-31 RX ORDER — HYDROMORPHONE HYDROCHLORIDE 1 MG/ML
0.4 INJECTION, SOLUTION INTRAMUSCULAR; INTRAVENOUS; SUBCUTANEOUS EVERY 2 HOUR PRN
Status: DISCONTINUED | OUTPATIENT
Start: 2020-07-31 | End: 2020-08-01

## 2020-07-31 RX ORDER — POLYETHYLENE GLYCOL 3350 17 G/17G
17 POWDER, FOR SOLUTION ORAL DAILY PRN
Status: DISCONTINUED | OUTPATIENT
Start: 2020-07-31 | End: 2020-08-01

## 2020-07-31 RX ORDER — CEFAZOLIN SODIUM/WATER 2 G/20 ML
2 SYRINGE (ML) INTRAVENOUS ONCE
Status: COMPLETED | OUTPATIENT
Start: 2020-07-31 | End: 2020-07-31

## 2020-07-31 RX ORDER — KETOROLAC TROMETHAMINE 15 MG/ML
15 INJECTION, SOLUTION INTRAMUSCULAR; INTRAVENOUS EVERY 6 HOURS
Status: DISCONTINUED | OUTPATIENT
Start: 2020-07-31 | End: 2020-08-01

## 2020-07-31 RX ORDER — ACETAMINOPHEN 325 MG/1
650 TABLET ORAL ONCE
Status: COMPLETED | OUTPATIENT
Start: 2020-07-31 | End: 2020-07-31

## 2020-07-31 RX ORDER — ACETAMINOPHEN 500 MG
500 TABLET ORAL EVERY 6 HOURS PRN
COMMUNITY
End: 2021-04-19

## 2020-07-31 RX ORDER — DIPHENHYDRAMINE HYDROCHLORIDE 50 MG/ML
12.5 INJECTION INTRAMUSCULAR; INTRAVENOUS AS NEEDED
Status: DISCONTINUED | OUTPATIENT
Start: 2020-07-31 | End: 2020-07-31 | Stop reason: HOSPADM

## 2020-07-31 RX ORDER — METOCLOPRAMIDE HYDROCHLORIDE 5 MG/ML
10 INJECTION INTRAMUSCULAR; INTRAVENOUS EVERY 6 HOURS PRN
Status: DISCONTINUED | OUTPATIENT
Start: 2020-07-31 | End: 2020-08-01

## 2020-07-31 RX ORDER — ONDANSETRON 2 MG/ML
4 INJECTION INTRAMUSCULAR; INTRAVENOUS AS NEEDED
Status: DISCONTINUED | OUTPATIENT
Start: 2020-07-31 | End: 2020-07-31 | Stop reason: HOSPADM

## 2020-07-31 RX ORDER — HYDROCODONE BITARTRATE AND ACETAMINOPHEN 10; 325 MG/1; MG/1
1 TABLET ORAL EVERY 4 HOURS PRN
Qty: 60 TABLET | Refills: 0 | Status: SHIPPED | OUTPATIENT
Start: 2020-07-31 | End: 2020-09-10 | Stop reason: ALTCHOICE

## 2020-07-31 RX ORDER — OXYCODONE HYDROCHLORIDE 5 MG/1
5 TABLET ORAL EVERY 4 HOURS PRN
Status: DISCONTINUED | OUTPATIENT
Start: 2020-07-31 | End: 2020-08-01

## 2020-07-31 RX ORDER — HYDROMORPHONE HYDROCHLORIDE 1 MG/ML
0.4 INJECTION, SOLUTION INTRAMUSCULAR; INTRAVENOUS; SUBCUTANEOUS EVERY 5 MIN PRN
Status: DISCONTINUED | OUTPATIENT
Start: 2020-07-31 | End: 2020-07-31 | Stop reason: HOSPADM

## 2020-07-31 RX ORDER — HYDROMORPHONE HYDROCHLORIDE 1 MG/ML
0.2 INJECTION, SOLUTION INTRAMUSCULAR; INTRAVENOUS; SUBCUTANEOUS EVERY 2 HOUR PRN
Status: DISCONTINUED | OUTPATIENT
Start: 2020-07-31 | End: 2020-08-01

## 2020-07-31 RX ORDER — KETOROLAC TROMETHAMINE 30 MG/ML
30 INJECTION, SOLUTION INTRAMUSCULAR; INTRAVENOUS EVERY 6 HOURS
Status: DISCONTINUED | OUTPATIENT
Start: 2020-07-31 | End: 2020-07-31

## 2020-07-31 RX ORDER — DOCUSATE SODIUM 100 MG/1
100 CAPSULE, LIQUID FILLED ORAL 2 TIMES DAILY
Status: DISCONTINUED | OUTPATIENT
Start: 2020-07-31 | End: 2020-08-01

## 2020-07-31 RX ORDER — SODIUM CHLORIDE, SODIUM LACTATE, POTASSIUM CHLORIDE, CALCIUM CHLORIDE 600; 310; 30; 20 MG/100ML; MG/100ML; MG/100ML; MG/100ML
INJECTION, SOLUTION INTRAVENOUS CONTINUOUS
Status: DISCONTINUED | OUTPATIENT
Start: 2020-07-31 | End: 2020-07-31 | Stop reason: HOSPADM

## 2020-07-31 RX ORDER — ROCURONIUM BROMIDE 10 MG/ML
INJECTION, SOLUTION INTRAVENOUS AS NEEDED
Status: DISCONTINUED | OUTPATIENT
Start: 2020-07-31 | End: 2020-07-31 | Stop reason: SURG

## 2020-07-31 RX ORDER — HYDROMORPHONE HYDROCHLORIDE 1 MG/ML
0.8 INJECTION, SOLUTION INTRAMUSCULAR; INTRAVENOUS; SUBCUTANEOUS EVERY 2 HOUR PRN
Status: DISCONTINUED | OUTPATIENT
Start: 2020-07-31 | End: 2020-08-01

## 2020-07-31 RX ORDER — LIDOCAINE HYDROCHLORIDE 10 MG/ML
INJECTION, SOLUTION EPIDURAL; INFILTRATION; INTRACAUDAL; PERINEURAL AS NEEDED
Status: DISCONTINUED | OUTPATIENT
Start: 2020-07-31 | End: 2020-07-31 | Stop reason: SURG

## 2020-07-31 RX ORDER — MEPERIDINE HYDROCHLORIDE 25 MG/ML
12.5 INJECTION INTRAMUSCULAR; INTRAVENOUS; SUBCUTANEOUS AS NEEDED
Status: DISCONTINUED | OUTPATIENT
Start: 2020-07-31 | End: 2020-07-31 | Stop reason: HOSPADM

## 2020-07-31 RX ORDER — METOCLOPRAMIDE HYDROCHLORIDE 5 MG/ML
10 INJECTION INTRAMUSCULAR; INTRAVENOUS AS NEEDED
Status: DISCONTINUED | OUTPATIENT
Start: 2020-07-31 | End: 2020-07-31 | Stop reason: HOSPADM

## 2020-07-31 RX ORDER — ONDANSETRON 2 MG/ML
4 INJECTION INTRAMUSCULAR; INTRAVENOUS EVERY 4 HOURS PRN
Status: DISCONTINUED | OUTPATIENT
Start: 2020-07-31 | End: 2020-08-01

## 2020-07-31 RX ORDER — BISACODYL 10 MG
10 SUPPOSITORY, RECTAL RECTAL
Status: DISCONTINUED | OUTPATIENT
Start: 2020-07-31 | End: 2020-08-01

## 2020-07-31 RX ORDER — TIZANIDINE 2 MG/1
2 TABLET ORAL 3 TIMES DAILY PRN
Status: DISCONTINUED | OUTPATIENT
Start: 2020-07-31 | End: 2020-08-01

## 2020-07-31 RX ORDER — DIPHENHYDRAMINE HYDROCHLORIDE 50 MG/ML
INJECTION INTRAMUSCULAR; INTRAVENOUS AS NEEDED
Status: DISCONTINUED | OUTPATIENT
Start: 2020-07-31 | End: 2020-07-31 | Stop reason: SURG

## 2020-07-31 RX ORDER — ACETAMINOPHEN 325 MG/1
TABLET ORAL
Status: COMPLETED
Start: 2020-07-31 | End: 2020-07-31

## 2020-07-31 RX ORDER — PROCHLORPERAZINE EDISYLATE 5 MG/ML
10 INJECTION INTRAMUSCULAR; INTRAVENOUS EVERY 6 HOURS PRN
Status: DISCONTINUED | OUTPATIENT
Start: 2020-07-31 | End: 2020-08-01

## 2020-07-31 RX ORDER — ZOLPIDEM TARTRATE 5 MG/1
5 TABLET ORAL NIGHTLY PRN
Status: DISCONTINUED | OUTPATIENT
Start: 2020-07-31 | End: 2020-07-31

## 2020-07-31 RX ORDER — OXYCODONE HYDROCHLORIDE 15 MG/1
15 TABLET ORAL EVERY 4 HOURS PRN
Status: DISCONTINUED | OUTPATIENT
Start: 2020-07-31 | End: 2020-08-01

## 2020-07-31 RX ORDER — DEXAMETHASONE SODIUM PHOSPHATE 4 MG/ML
4 VIAL (ML) INJECTION AS NEEDED
Status: DISCONTINUED | OUTPATIENT
Start: 2020-07-31 | End: 2020-07-31 | Stop reason: HOSPADM

## 2020-07-31 RX ORDER — NALOXONE HYDROCHLORIDE 0.4 MG/ML
80 INJECTION, SOLUTION INTRAMUSCULAR; INTRAVENOUS; SUBCUTANEOUS AS NEEDED
Status: DISCONTINUED | OUTPATIENT
Start: 2020-07-31 | End: 2020-07-31 | Stop reason: HOSPADM

## 2020-07-31 RX ORDER — HYDROMORPHONE HYDROCHLORIDE 1 MG/ML
INJECTION, SOLUTION INTRAMUSCULAR; INTRAVENOUS; SUBCUTANEOUS
Status: COMPLETED
Start: 2020-07-31 | End: 2020-07-31

## 2020-07-31 RX ORDER — CEFAZOLIN SODIUM/WATER 2 G/20 ML
2 SYRINGE (ML) INTRAVENOUS EVERY 8 HOURS
Status: COMPLETED | OUTPATIENT
Start: 2020-07-31 | End: 2020-08-01

## 2020-07-31 RX ORDER — DEXAMETHASONE SODIUM PHOSPHATE 4 MG/ML
VIAL (ML) INJECTION AS NEEDED
Status: DISCONTINUED | OUTPATIENT
Start: 2020-07-31 | End: 2020-07-31 | Stop reason: SURG

## 2020-07-31 RX ORDER — DEXAMETHASONE SODIUM PHOSPHATE 10 MG/ML
8 INJECTION, SOLUTION INTRAMUSCULAR; INTRAVENOUS ONCE
Status: COMPLETED | OUTPATIENT
Start: 2020-08-01 | End: 2020-08-01

## 2020-07-31 RX ORDER — OXYCODONE HYDROCHLORIDE 10 MG/1
10 TABLET ORAL EVERY 4 HOURS PRN
Status: DISCONTINUED | OUTPATIENT
Start: 2020-07-31 | End: 2020-08-01

## 2020-07-31 RX ORDER — MIDAZOLAM HYDROCHLORIDE 1 MG/ML
INJECTION INTRAMUSCULAR; INTRAVENOUS AS NEEDED
Status: DISCONTINUED | OUTPATIENT
Start: 2020-07-31 | End: 2020-07-31 | Stop reason: SURG

## 2020-07-31 RX ORDER — SODIUM CHLORIDE, SODIUM LACTATE, POTASSIUM CHLORIDE, CALCIUM CHLORIDE 600; 310; 30; 20 MG/100ML; MG/100ML; MG/100ML; MG/100ML
INJECTION, SOLUTION INTRAVENOUS CONTINUOUS
Status: DISCONTINUED | OUTPATIENT
Start: 2020-07-31 | End: 2020-08-01

## 2020-07-31 RX ORDER — GLYCOPYRROLATE 0.2 MG/ML
INJECTION, SOLUTION INTRAMUSCULAR; INTRAVENOUS AS NEEDED
Status: DISCONTINUED | OUTPATIENT
Start: 2020-07-31 | End: 2020-07-31 | Stop reason: SURG

## 2020-07-31 RX ORDER — ENOXAPARIN SODIUM 150 MG/ML
1 INJECTION SUBCUTANEOUS EVERY 12 HOURS
COMMUNITY
End: 2020-08-10

## 2020-07-31 RX ORDER — NEOSTIGMINE METHYLSULFATE 1 MG/ML
INJECTION INTRAVENOUS AS NEEDED
Status: DISCONTINUED | OUTPATIENT
Start: 2020-07-31 | End: 2020-07-31 | Stop reason: SURG

## 2020-07-31 RX ORDER — ACETAMINOPHEN 500 MG
1000 TABLET ORAL 4 TIMES DAILY
Status: DISCONTINUED | OUTPATIENT
Start: 2020-07-31 | End: 2020-08-01

## 2020-07-31 RX ORDER — SODIUM PHOSPHATE, DIBASIC AND SODIUM PHOSPHATE, MONOBASIC 7; 19 G/133ML; G/133ML
1 ENEMA RECTAL ONCE AS NEEDED
Status: DISCONTINUED | OUTPATIENT
Start: 2020-07-31 | End: 2020-08-01

## 2020-07-31 RX ADMIN — DEXAMETHASONE SODIUM PHOSPHATE 4 MG: 4 MG/ML VIAL (ML) INJECTION at 08:23:00

## 2020-07-31 RX ADMIN — ONDANSETRON 4 MG: 2 INJECTION INTRAMUSCULAR; INTRAVENOUS at 09:34:00

## 2020-07-31 RX ADMIN — SODIUM CHLORIDE, SODIUM LACTATE, POTASSIUM CHLORIDE, CALCIUM CHLORIDE: 600; 310; 30; 20 INJECTION, SOLUTION INTRAVENOUS at 09:42:00

## 2020-07-31 RX ADMIN — DIPHENHYDRAMINE HYDROCHLORIDE 12.5 MG: 50 INJECTION INTRAMUSCULAR; INTRAVENOUS at 08:23:00

## 2020-07-31 RX ADMIN — GLYCOPYRROLATE 0.6 MG: 0.2 INJECTION, SOLUTION INTRAMUSCULAR; INTRAVENOUS at 09:47:00

## 2020-07-31 RX ADMIN — ROCURONIUM BROMIDE 50 MG: 10 INJECTION, SOLUTION INTRAVENOUS at 08:23:00

## 2020-07-31 RX ADMIN — VANCOMYCIN HYDROCHLORIDE 1.25 G: at 08:36:00

## 2020-07-31 RX ADMIN — LIDOCAINE HYDROCHLORIDE 50 MG: 10 INJECTION, SOLUTION EPIDURAL; INFILTRATION; INTRACAUDAL; PERINEURAL at 08:23:00

## 2020-07-31 RX ADMIN — NEOSTIGMINE METHYLSULFATE 3 MG: 1 INJECTION INTRAVENOUS at 09:47:00

## 2020-07-31 RX ADMIN — MIDAZOLAM HYDROCHLORIDE 2 MG: 1 INJECTION INTRAMUSCULAR; INTRAVENOUS at 08:10:00

## 2020-07-31 RX ADMIN — CEFAZOLIN SODIUM/WATER 2 G: 2 G/20 ML SYRINGE (ML) INTRAVENOUS at 08:30:00

## 2020-07-31 NOTE — ANESTHESIA POSTPROCEDURE EVALUATION
[address] [facility] Patient Status:  Outpatient in a Bed   Age/Gender 72year old female MRN [id number]   Location [address] [facility] Attending Marquise Longoria MD   Hosp Day # 0 PCP Alisha Boyd MD       Anesthesia Post-op

## 2020-07-31 NOTE — PLAN OF CARE
Pt A & O x4, on RA. /IS. SCDs. Last BM 7/31, up x sba, Foam dressing CDI. Ice pack. IVF. FRANCI with CPAP. Fall precautions. Arm precautions. Pt denies pain. Numbness noted to LUE, in immobilizer.  at bedside. Will continue to monitor.

## 2020-07-31 NOTE — BRIEF OP NOTE
Pre-Operative Diagnosis: Arthritis of left shoulder region [M19.012]     Post-Operative Diagnosis: Arthritis of left shoulder region [M19.012]      Procedure Performed:   Procedure(s):  LEFT REVERSE TOTAL SHOULDER ARTHROPLASTY    Surgeon(s) and Role:     *

## 2020-07-31 NOTE — ANESTHESIA PROCEDURE NOTES
Airway  Date/Time: 7/31/2020 8:27 AM  Urgency: elective      General Information and Staff    Patient location during procedure: OR  Anesthesiologist: Walter Gamboa MD  Performed: anesthesiologist     Indications and Patient Condition  Indications for

## 2020-07-31 NOTE — ANESTHESIA PREPROCEDURE EVALUATION
PRE-OP EVALUATION    Patient Name: Karn Epley    Pre-op Diagnosis: Arthritis of left shoulder region [M19.012]    Procedure(s):  LEFT REVERSE TOTAL SHOULDER ARTHROPLASTY    Surgeon(s) and Role:     Osorio Kearns MD - Primary    Pre-op vitals reviewe allergies. Anesthesia Evaluation    Patient summary reviewed. Anesthetic Complications  (-) history of anesthetic complications         GI/Hepatic/Renal    Negative GI/hepatic/renal ROS. Cardiovascular      ECG reviewed. Use      Smoking status: Never Smoker      Smokeless tobacco: Never Used    Alcohol use:  Yes      Alcohol/week: 0.0 standard drinks      Frequency: Monthly or less      Drinks per session: 1 or 2      Binge frequency: Never      Comment: rarely       Drug

## 2020-07-31 NOTE — INTERVAL H&P NOTE
Pre-op Diagnosis: Arthritis of left shoulder region [M19.012]    The above referenced H&P was reviewed by ISAAC Holloway on 6/04/6913, the patient was examined and no significant changes have occurred in the patient's condition since the H&P was perfor

## 2020-07-31 NOTE — PROGRESS NOTES
[facility] Pharmacy Note:  Age Based Dose Adjustment    Yovanny Catherine has been prescribed ketorolac (TORADOL) 30 mg IV every 6 hours. Patient is >71 years old therefore the dose has been adjusted to 15 mg IV every 6 hours.       Thank you,  Nick Butler

## 2020-07-31 NOTE — ANESTHESIA PROCEDURE NOTES
Regional Block  Performed by: Bao Kaplan MD  Authorized by: Bao Kaplan MD       General Information and Staff    Start Time:  7/31/2020 8:11 AM  End Time:  7/31/2020 8:21 AM  Anesthesiologist:  Bao Kaplan MD  Performed by:   Anesthes

## 2020-07-31 NOTE — CONSULTS
General Medicine Consult      Reason for consult: post-op medical management     Consulted by: Dr. Patricia Andujar    PCP: Tae Pierre MD      History of Present Illness: Patient is a 72year old female with PMH sig for OA, depression, HTN, HLD, FRANCI, hx PE who prese chronicity ([facility] [address] 10/29/2018   • Subluxation of metatarsophalangeal joint of lesser toe, left, initial encounter 4/19/2017    Resolved last addressed  5/1/17   • Unspecified sleep apnea PSG 11-10-14 TX 12-17-14    AHI 59 overall RDI 59 SaO2 brenda 81 % CPAP every 12 (twelve) hours. , Disp: , Rfl:   HYDROcodone-acetaminophen  MG Oral Tab, Take 1 tablet by mouth every 4 (four) hours as needed. , Disp: 60 tablet, Rfl: 0  Magnesium 500 MG Oral Tab, Take by mouth., Disp: , Rfl:   Mupirocin Calcium 2 % External status: Never Smoker      Smokeless tobacco: Never Used    Alcohol use:  Yes      Alcohol/week: 0.0 standard drinks      Frequency: Monthly or less      Drinks per session: 1 or 2      Binge frequency: Never      Comment: rarely        Fam Hx  Family Histor (CPT=73020)  TECHNIQUE:  AP view of the shoulder was obtained. COMPARISON:  None. INDICATIONS:   PATIENT STATED HISTORY: (As transcribed by Technologist)  Left total shoulder replacement.     FINDINGS:  OTHER:  Single image obtained intraoperatively arthur

## 2020-08-01 VITALS
RESPIRATION RATE: 20 BRPM | BODY MASS INDEX: 31.7 KG/M2 | SYSTOLIC BLOOD PRESSURE: 128 MMHG | HEIGHT: 67 IN | OXYGEN SATURATION: 99 % | TEMPERATURE: 98 F | HEART RATE: 58 BPM | WEIGHT: 201.94 LBS | DIASTOLIC BLOOD PRESSURE: 63 MMHG

## 2020-08-01 LAB
ANION GAP SERPL CALC-SCNC: 4 MMOL/L (ref 0–18)
BUN BLD-MCNC: 19 MG/DL (ref 7–18)
BUN/CREAT SERPL: 26.8 (ref 10–20)
CALCIUM BLD-MCNC: 8.4 MG/DL (ref 8.5–10.1)
CHLORIDE SERPL-SCNC: 100 MMOL/L (ref 98–112)
CO2 SERPL-SCNC: 30 MMOL/L (ref 21–32)
CREAT BLD-MCNC: 0.71 MG/DL (ref 0.55–1.02)
GLUCOSE BLD-MCNC: 121 MG/DL (ref 70–99)
HGB BLD-MCNC: 10.6 G/DL (ref 12–16)
HGB BLD-MCNC: 9.7 G/DL (ref 12–16)
OSMOLALITY SERPL CALC.SUM OF ELEC: 282 MOSM/KG (ref 275–295)
POTASSIUM SERPL-SCNC: 3.7 MMOL/L (ref 3.5–5.1)
SODIUM SERPL-SCNC: 134 MMOL/L (ref 136–145)

## 2020-08-01 PROCEDURE — 85018 HEMOGLOBIN: CPT | Performed by: INTERNAL MEDICINE

## 2020-08-01 PROCEDURE — 96376 TX/PRO/DX INJ SAME DRUG ADON: CPT

## 2020-08-01 PROCEDURE — 97165 OT EVAL LOW COMPLEX 30 MIN: CPT

## 2020-08-01 PROCEDURE — 97535 SELF CARE MNGMENT TRAINING: CPT

## 2020-08-01 PROCEDURE — 80048 BASIC METABOLIC PNL TOTAL CA: CPT | Performed by: INTERNAL MEDICINE

## 2020-08-01 RX ORDER — ACETAMINOPHEN 325 MG/1
325 TABLET ORAL EVERY 4 HOURS PRN
Status: DISCONTINUED | OUTPATIENT
Start: 2020-08-01 | End: 2020-08-01

## 2020-08-01 RX ORDER — ACETAMINOPHEN 325 MG/1
650 TABLET ORAL EVERY 4 HOURS PRN
Status: DISCONTINUED | OUTPATIENT
Start: 2020-08-01 | End: 2020-08-01

## 2020-08-01 RX ORDER — HYDROCODONE BITARTRATE AND ACETAMINOPHEN 10; 325 MG/1; MG/1
2 TABLET ORAL EVERY 4 HOURS PRN
Status: DISCONTINUED | OUTPATIENT
Start: 2020-08-01 | End: 2020-08-01

## 2020-08-01 RX ORDER — HYDROCODONE BITARTRATE AND ACETAMINOPHEN 10; 325 MG/1; MG/1
1 TABLET ORAL EVERY 4 HOURS PRN
Status: DISCONTINUED | OUTPATIENT
Start: 2020-08-01 | End: 2020-08-01

## 2020-08-01 NOTE — PROGRESS NOTES
[facility] Hospitalist Progress Note     [facility]      SUBJECTIVE:  Feeling well  NO acute events overnight  Pain controlled  Eating breakfast    OBJECTIVE:  Temp:  [97.8 °F (36.6 °C)-98.9 °F (37.2 °C)] 97.8 °F (36.6 °C)  Pulse:  [50-79] 62  Resp:  [13-2 MD Jalyn on 7/31/2020 at 11:24 AM          Meds:   HYDROcodone-acetaminophen  MG Oral Tab, Take 1 tablet by mouth every 4 (four) hours as needed.         lactated ringers infusion, , Intravenous, Continuous  sodium chloride 0.9% IV bolus 500 mL, 500 mL who presented for planned left total shoulder arthroplasty.     # s/p left total shoulder  - Management per ortho  - PT/OT     # Post-op pain   - Transition to PO pain medications as tolerated  - Bowel regimen    # Acute blood loss anemia  - Hgb 13.5 pre-op

## 2020-08-01 NOTE — PLAN OF CARE
States pain well controlled. Has participated with therapy. States tolerated activity well. Dressing to left shoulder dry, intact, small amount of old drainage under aquacel dressing. Has viewed the discharge instruction video.   Verbal and written disc

## 2020-08-01 NOTE — PROGRESS NOTES
Post Op Day 1 Ortho Note: Left reverse TSA    Status Post Nerve Block:  Type of Nerve Block: Left Interscalene  Single Injection Nerve Block    Post op review: No evidence of immediate block related complications and No paresthesia noted    Assessed pt in

## 2020-08-01 NOTE — PLAN OF CARE
Pt. Admitted for L total shoulder by Dr. Willy Carrillo on 07-31-20, POD 1. Pain is well controlled, nerve block still functioning. Ambulates with standby assist. Incision on L shoulder is cdi with microfoam tape, wearing immobilizer at all times.  VS remain stabl

## 2020-08-01 NOTE — PROGRESS NOTES
[facility]  Orthopedic Surgery  Progress Note    Juan Carlos Abreu Patient Status:  Outpatient in a Bed    3/20/1950 MRN [id number]   [facility] 3SW-A Attending Fred Christianson MD   Hosp Day # 0 PCP Alex Domingo MD     SUBJECTIVE:  INT

## 2020-08-01 NOTE — CM/SW NOTE
SW order placed to assist with discharge planning. Per RN[facility].    RYDER attempted to speak with pt who was not responding to her phone.   RYDER contacted [facility]  P:[phone]  F:[phone] to meet with pt to discuss

## 2020-08-01 NOTE — OCCUPATIONAL THERAPY NOTE
OCCUPATIONAL THERAPY EVALUATION - INPATIENT     Room Number: 370/370-A  Evaluation Date: 8/1/2020  Type of Evaluation: Initial  Presenting Problem: (s/p L reverse total shoulder 7/31/2020)    Physician Order: IP Consult to Occupational Therapy  Reason for last addressed  4/8/16   • Primary osteoarthritis of left hip  Global 01/18/2019 8/21/2018   • Primary osteoarthritis of right hip 9/2/2016    Resolved last addressed  4/6/17   • Pulmonary embolism ([address] 07/2017   • PVD (posterior vitreous detachment), mario right TKR   • KNEE TOTAL REPLACEMENT Right 12/4/2015    Performed by Livia Redmond MD at [place] [address] / [address] Left 5/15/2017    Performed by Miki Deluca DPM at [address] [place] [address]   • OTHER  10/2018    Left hip replacement appropriately. Behavioral/Emotional/Social: WFL. Pt eager and motivated to participate in therapy.     RANGE OF MOTION AND STRENGTH ASSESSMENT  Upper extremity ROM is within functional limits except LUE d/t L reverse total shoulder    Upper extremity str 10 reps hand squeezes, 10 reps wrist flex/ext; 10 reps elbow flex/ext; 10 reps forearm supination; 10 reps AA flexion with good tolerance. Dr. Stroud Mom total and reverse shoulder replacement routine visual handout provided to patient with visuals.    Pt educat patient is functioning below her previous functional level and would benefit from skilled inpatient OT to address the above deficits, maximizing patient’s ability to return safely to her prior level of function.     Patient Complexity  Occupational Profile/

## 2020-08-01 NOTE — PLAN OF CARE
Spoke with [address], arrangements made for home health rn & pt/ot. Discharged via wheel chair, accompanied by transport staff., with prescription for hydrocodone-acetaminophen.

## 2020-08-01 NOTE — OPERATIVE REPORT
[facility]    PATIENT'S NAME: Maged Bennett   ATTENDING PHYSICIAN: Porter Rizzo M.D. OPERATING PHYSICIAN: Porter Rizzo M.D.    PATIENT ACCOUNT#:   [de-identified]    LOCATION:  [address] [place] [address]  MEDICAL RECORD #:   [id number]       DATE OF BIRTH:  [birth year] tuberosity. The subscapularis was released and allowed to retract medially. The arm was slowly brought into external rotation and extension.   A huge osteophyte was noted off the inferior aspect of the humeral head and there was significant degenerative c gauge was used to measure the drill hole and I chose a 6.5 mm x 30 mm long central screw. I screwed the baseplate into position. I then placed my peripheral screws.   I used the appropriate guide, the smaller drill bit, depth gauge, and screws measuring 2 postoperative instructions were written. There were no complications. Patient tolerated the procedure well. The bone was submitted to Pathology.   The assistant surgeon was mandatory to assist with positioning the patient, retraction, closure of the inci

## 2020-08-01 NOTE — HOME CARE LIAISON
Received referral from [address] [place] [address] with patient at the bedside to discuss home health services and offer choice. Patient is agreeable to [facility] services at discharge. Brochure and contact information provided. Any questions addressed. Will follow.

## 2020-08-01 NOTE — PHYSICAL THERAPY NOTE
PT orders recd through TSA order set. Pt evaluated by OT, Ck, and pt found to be at independent level for all fxal mobility. According to current rehab dept policy, due to pt ind with mobility, PT evaluation not necessary. Will dc current orders.   See

## 2020-08-03 PROBLEM — Z47.89 ORTHOPEDIC AFTERCARE: Status: ACTIVE | Noted: 2020-08-03

## 2020-11-04 PROBLEM — N39.0 RECURRENT UTI: Status: ACTIVE | Noted: 2020-11-04

## 2021-09-13 PROBLEM — N39.0 RECURRENT UTI: Status: RESOLVED | Noted: 2020-11-04 | Resolved: 2021-09-13

## 2021-09-13 PROBLEM — R73.9 HYPERGLYCEMIA: Status: RESOLVED | Noted: 2018-08-10 | Resolved: 2021-09-13

## 2021-10-08 PROBLEM — M79.641 RIGHT HAND PAIN: Status: ACTIVE | Noted: 2021-10-08

## 2022-03-23 PROBLEM — F33.42 RECURRENT MAJOR DEPRESSIVE DISORDER, IN FULL REMISSION (HCC): Status: ACTIVE | Noted: 2022-03-23

## 2022-12-15 ENCOUNTER — HOSPITAL ENCOUNTER (EMERGENCY)
Facility: HOSPITAL | Age: 72
Discharge: HOME | End: 2022-12-15
Attending: EMERGENCY MEDICINE
Payer: MEDICARE

## 2022-12-15 ENCOUNTER — APPOINTMENT (OUTPATIENT)
Dept: GENERAL RADIOLOGY | Facility: HOSPITAL | Age: 72
End: 2022-12-15
Payer: MEDICARE

## 2022-12-15 VITALS
SYSTOLIC BLOOD PRESSURE: 120 MMHG | TEMPERATURE: 99 F | WEIGHT: 195 LBS | RESPIRATION RATE: 17 BRPM | HEIGHT: 67 IN | HEART RATE: 65 BPM | DIASTOLIC BLOOD PRESSURE: 67 MMHG | OXYGEN SATURATION: 97 % | BODY MASS INDEX: 30.61 KG/M2

## 2022-12-15 DIAGNOSIS — H69.82 EUSTACHIAN TUBE DYSFUNCTION, LEFT: ICD-10-CM

## 2022-12-15 DIAGNOSIS — R07.89 CHEST PAIN, ATYPICAL: Primary | ICD-10-CM

## 2022-12-15 LAB
ALBUMIN SERPL-MCNC: 3.9 G/DL (ref 3.4–5)
ALBUMIN/GLOB SERPL: 1.1 {RATIO} (ref 1–2)
ALP LIVER SERPL-CCNC: 46 U/L
ALT SERPL-CCNC: 26 U/L
ANION GAP SERPL CALC-SCNC: 6 MMOL/L (ref 0–18)
AST SERPL-CCNC: 27 U/L (ref 15–37)
BASOPHILS # BLD AUTO: 0.03 X10(3) UL (ref 0–0.2)
BASOPHILS NFR BLD AUTO: 0.4 %
BILIRUB SERPL-MCNC: 0.2 MG/DL (ref 0.1–2)
BUN BLD-MCNC: 24 MG/DL (ref 7–18)
CALCIUM BLD-MCNC: 9.7 MG/DL (ref 8.5–10.1)
CHLORIDE SERPL-SCNC: 105 MMOL/L (ref 98–112)
CO2 SERPL-SCNC: 26 MMOL/L (ref 21–32)
CREAT BLD-MCNC: 0.77 MG/DL
D DIMER PPP FEU-MCNC: 0.4 UG/ML FEU (ref ?–0.71)
EOSINOPHIL # BLD AUTO: 0.07 X10(3) UL (ref 0–0.7)
EOSINOPHIL NFR BLD AUTO: 0.9 %
ERYTHROCYTE [DISTWIDTH] IN BLOOD BY AUTOMATED COUNT: 12.3 %
FLUAV + FLUBV RNA SPEC NAA+PROBE: NEGATIVE
FLUAV + FLUBV RNA SPEC NAA+PROBE: NEGATIVE
GFR SERPLBLD BASED ON 1.73 SQ M-ARVRAT: 82 ML/MIN/1.73M2 (ref 60–?)
GLOBULIN PLAS-MCNC: 3.7 G/DL (ref 2.8–4.4)
GLUCOSE BLD-MCNC: 104 MG/DL (ref 70–99)
HCT VFR BLD AUTO: 40 %
HGB BLD-MCNC: 13.4 G/DL
IMM GRANULOCYTES # BLD AUTO: 0.02 X10(3) UL (ref 0–1)
IMM GRANULOCYTES NFR BLD: 0.3 %
LYMPHOCYTES # BLD AUTO: 2.02 X10(3) UL (ref 1–4)
LYMPHOCYTES NFR BLD AUTO: 25.7 %
MCH RBC QN AUTO: 32 PG (ref 26–34)
MCHC RBC AUTO-ENTMCNC: 33.5 G/DL (ref 31–37)
MCV RBC AUTO: 95.5 FL
MONOCYTES # BLD AUTO: 0.59 X10(3) UL (ref 0.1–1)
MONOCYTES NFR BLD AUTO: 7.5 %
NEUTROPHILS # BLD AUTO: 5.13 X10 (3) UL (ref 1.5–7.7)
NEUTROPHILS # BLD AUTO: 5.13 X10(3) UL (ref 1.5–7.7)
NEUTROPHILS NFR BLD AUTO: 65.2 %
NT-PROBNP SERPL-MCNC: 38 PG/ML (ref ?–125)
OSMOLALITY SERPL CALC.SUM OF ELEC: 288 MOSM/KG (ref 275–295)
PLATELET # BLD AUTO: 241 10(3)UL (ref 150–450)
POTASSIUM SERPL-SCNC: 3.6 MMOL/L (ref 3.5–5.1)
PROT SERPL-MCNC: 7.6 G/DL (ref 6.4–8.2)
RBC # BLD AUTO: 4.19 X10(6)UL
RSV RNA SPEC NAA+PROBE: NEGATIVE
SARS-COV-2 RNA RESP QL NAA+PROBE: NOT DETECTED
SODIUM SERPL-SCNC: 137 MMOL/L (ref 136–145)
TROPONIN I HIGH SENSITIVITY: 8 NG/L
TROPONIN I HIGH SENSITIVITY: 9 NG/L
WBC # BLD AUTO: 7.9 X10(3) UL (ref 4–11)

## 2022-12-15 PROCEDURE — 85025 COMPLETE CBC W/AUTO DIFF WBC: CPT

## 2022-12-15 PROCEDURE — 84484 ASSAY OF TROPONIN QUANT: CPT | Performed by: EMERGENCY MEDICINE

## 2022-12-15 PROCEDURE — 93005 ELECTROCARDIOGRAM TRACING: CPT

## 2022-12-15 PROCEDURE — 71045 X-RAY EXAM CHEST 1 VIEW: CPT | Performed by: EMERGENCY MEDICINE

## 2022-12-15 PROCEDURE — 83880 ASSAY OF NATRIURETIC PEPTIDE: CPT | Performed by: EMERGENCY MEDICINE

## 2022-12-15 PROCEDURE — 0241U SARS-COV-2/FLU A AND B/RSV BY PCR (GENEXPERT): CPT | Performed by: EMERGENCY MEDICINE

## 2022-12-15 PROCEDURE — 99285 EMERGENCY DEPT VISIT HI MDM: CPT

## 2022-12-15 PROCEDURE — 93010 ELECTROCARDIOGRAM REPORT: CPT

## 2022-12-15 PROCEDURE — 36415 COLL VENOUS BLD VENIPUNCTURE: CPT

## 2022-12-15 PROCEDURE — 85025 COMPLETE CBC W/AUTO DIFF WBC: CPT | Performed by: EMERGENCY MEDICINE

## 2022-12-15 PROCEDURE — 80053 COMPREHEN METABOLIC PANEL: CPT | Performed by: EMERGENCY MEDICINE

## 2022-12-15 PROCEDURE — 84484 ASSAY OF TROPONIN QUANT: CPT

## 2022-12-15 PROCEDURE — 85379 FIBRIN DEGRADATION QUANT: CPT | Performed by: EMERGENCY MEDICINE

## 2022-12-15 PROCEDURE — 99284 EMERGENCY DEPT VISIT MOD MDM: CPT

## 2022-12-15 PROCEDURE — 80053 COMPREHEN METABOLIC PANEL: CPT

## 2022-12-15 RX ORDER — FLUTICASONE PROPIONATE 50 MCG
2 SPRAY, SUSPENSION (ML) NASAL DAILY
Qty: 16 G | Refills: 0 | Status: SHIPPED | OUTPATIENT
Start: 2022-12-15 | End: 2023-01-14

## 2022-12-15 NOTE — ED INITIAL ASSESSMENT (HPI)
Pt went to her doctor for possible ear infection. Pt mentioned she was having Chest heaviness and sent to the ED for work up. Pt was off her xarelto because she was having a colonoscopy.

## 2022-12-16 LAB
ATRIAL RATE: 80 BPM
P AXIS: 36 DEGREES
P-R INTERVAL: 160 MS
Q-T INTERVAL: 386 MS
QRS DURATION: 78 MS
QTC CALCULATION (BEZET): 445 MS
R AXIS: -9 DEGREES
T AXIS: 35 DEGREES
VENTRICULAR RATE: 80 BPM

## 2023-01-01 NOTE — ANESTHESIA POSTPROCEDURE EVALUATION
[address] [facility] Patient Status:  Surgery Admit   Age/Gender 80year old female MRN [id number]   [facility] SURGERY Attending Annamarie Kayser, MD   Hosp Day # 0 PCP Abby Benz MD       Anesthesia Post-op Note    Procedure(s):
Yes
with patient

## 2023-07-21 ENCOUNTER — APPOINTMENT (OUTPATIENT)
Dept: GENERAL RADIOLOGY | Facility: HOSPITAL | Age: 73
End: 2023-07-21
Payer: MEDICARE

## 2023-07-21 ENCOUNTER — HOSPITAL ENCOUNTER (EMERGENCY)
Facility: HOSPITAL | Age: 73
Discharge: HOME | End: 2023-07-21
Attending: EMERGENCY MEDICINE
Payer: MEDICARE

## 2023-07-21 VITALS
HEART RATE: 62 BPM | HEIGHT: 67 IN | RESPIRATION RATE: 17 BRPM | DIASTOLIC BLOOD PRESSURE: 81 MMHG | OXYGEN SATURATION: 96 % | BODY MASS INDEX: 30.61 KG/M2 | WEIGHT: 195 LBS | SYSTOLIC BLOOD PRESSURE: 133 MMHG | TEMPERATURE: 98 F

## 2023-07-21 DIAGNOSIS — R07.89 CHEST PRESSURE: Primary | ICD-10-CM

## 2023-07-21 DIAGNOSIS — E87.6 HYPOKALEMIA: ICD-10-CM

## 2023-07-21 LAB
ALBUMIN SERPL-MCNC: 4.1 G/DL (ref 3.4–5)
ALBUMIN/GLOB SERPL: 1.2 {RATIO} (ref 1–2)
ALP LIVER SERPL-CCNC: 53 U/L
ALT SERPL-CCNC: 32 U/L
ANION GAP SERPL CALC-SCNC: 7 MMOL/L (ref 0–18)
AST SERPL-CCNC: 30 U/L (ref 15–37)
ATRIAL RATE: 86 BPM
BASOPHILS # BLD AUTO: 0.03 X10(3) UL (ref 0–0.2)
BASOPHILS NFR BLD AUTO: 0.4 %
BILIRUB SERPL-MCNC: 0.4 MG/DL (ref 0.1–2)
BUN BLD-MCNC: 22 MG/DL (ref 7–18)
CALCIUM BLD-MCNC: 9.5 MG/DL (ref 8.5–10.1)
CHLORIDE SERPL-SCNC: 104 MMOL/L (ref 98–112)
CO2 SERPL-SCNC: 25 MMOL/L (ref 21–32)
CREAT BLD-MCNC: 0.81 MG/DL
EGFRCR SERPLBLD CKD-EPI 2021: 77 ML/MIN/1.73M2 (ref 60–?)
EOSINOPHIL # BLD AUTO: 0.04 X10(3) UL (ref 0–0.7)
EOSINOPHIL NFR BLD AUTO: 0.6 %
ERYTHROCYTE [DISTWIDTH] IN BLOOD BY AUTOMATED COUNT: 12.3 %
GLOBULIN PLAS-MCNC: 3.5 G/DL (ref 2.8–4.4)
GLUCOSE BLD-MCNC: 130 MG/DL (ref 70–99)
HCT VFR BLD AUTO: 38.3 %
HGB BLD-MCNC: 13 G/DL
IMM GRANULOCYTES # BLD AUTO: 0.03 X10(3) UL (ref 0–1)
IMM GRANULOCYTES NFR BLD: 0.4 %
LYMPHOCYTES # BLD AUTO: 1.46 X10(3) UL (ref 1–4)
LYMPHOCYTES NFR BLD AUTO: 20.6 %
MCH RBC QN AUTO: 31.6 PG (ref 26–34)
MCHC RBC AUTO-ENTMCNC: 33.9 G/DL (ref 31–37)
MCV RBC AUTO: 93.2 FL
MONOCYTES # BLD AUTO: 0.63 X10(3) UL (ref 0.1–1)
MONOCYTES NFR BLD AUTO: 8.9 %
NEUTROPHILS # BLD AUTO: 4.9 X10 (3) UL (ref 1.5–7.7)
NEUTROPHILS # BLD AUTO: 4.9 X10(3) UL (ref 1.5–7.7)
NEUTROPHILS NFR BLD AUTO: 69.1 %
OSMOLALITY SERPL CALC.SUM OF ELEC: 287 MOSM/KG (ref 275–295)
P AXIS: 44 DEGREES
P-R INTERVAL: 158 MS
PLATELET # BLD AUTO: 269 10(3)UL (ref 150–450)
POTASSIUM SERPL-SCNC: 3.2 MMOL/L (ref 3.5–5.1)
PROT SERPL-MCNC: 7.6 G/DL (ref 6.4–8.2)
Q-T INTERVAL: 384 MS
QRS DURATION: 86 MS
QTC CALCULATION (BEZET): 459 MS
R AXIS: 4 DEGREES
RBC # BLD AUTO: 4.11 X10(6)UL
SODIUM SERPL-SCNC: 136 MMOL/L (ref 136–145)
T AXIS: 46 DEGREES
TROPONIN I HIGH SENSITIVITY: 9 NG/L
VENTRICULAR RATE: 86 BPM
WBC # BLD AUTO: 7.1 X10(3) UL (ref 4–11)

## 2023-07-21 PROCEDURE — 99284 EMERGENCY DEPT VISIT MOD MDM: CPT

## 2023-07-21 PROCEDURE — 93010 ELECTROCARDIOGRAM REPORT: CPT

## 2023-07-21 PROCEDURE — 85025 COMPLETE CBC W/AUTO DIFF WBC: CPT | Performed by: EMERGENCY MEDICINE

## 2023-07-21 PROCEDURE — 80053 COMPREHEN METABOLIC PANEL: CPT

## 2023-07-21 PROCEDURE — 80053 COMPREHEN METABOLIC PANEL: CPT | Performed by: EMERGENCY MEDICINE

## 2023-07-21 PROCEDURE — 84484 ASSAY OF TROPONIN QUANT: CPT | Performed by: EMERGENCY MEDICINE

## 2023-07-21 PROCEDURE — 36415 COLL VENOUS BLD VENIPUNCTURE: CPT

## 2023-07-21 PROCEDURE — 84484 ASSAY OF TROPONIN QUANT: CPT

## 2023-07-21 PROCEDURE — 85025 COMPLETE CBC W/AUTO DIFF WBC: CPT

## 2023-07-21 PROCEDURE — 93005 ELECTROCARDIOGRAM TRACING: CPT

## 2023-07-21 PROCEDURE — 71045 X-RAY EXAM CHEST 1 VIEW: CPT

## 2023-07-21 PROCEDURE — 99285 EMERGENCY DEPT VISIT HI MDM: CPT

## 2023-07-21 RX ORDER — POTASSIUM CHLORIDE 20 MEQ/1
40 TABLET, EXTENDED RELEASE ORAL ONCE
Status: COMPLETED | OUTPATIENT
Start: 2023-07-21 | End: 2023-07-21

## 2023-07-21 NOTE — DISCHARGE INSTRUCTIONS
Consider taking omeprazole regularly for the next 3 weeks. Or use Pepcid daily. Avoid  ibuprofen, naproxen as much as possible.   Decrease your coffee and other caffeine intake  Avoid alcohol and citrus or acidic foods  Avoid spicy or heavy foods if this makes your pain worse  Followup with your primary physician  Return for any problems

## 2023-07-21 NOTE — ED INITIAL ASSESSMENT (HPI)
Here for eval of chest pain and palpitations. Patient states symptoms have been off and on for the past week. Patient states she is also having back pain and belching.

## 2023-07-21 NOTE — ED QUICK NOTES
Pt reevaluated by er physician. Informed of her test reports and plan of care.  Verbalizing understanding

## 2024-08-14 NOTE — PHYSICAL THERAPY NOTE
[id number]: || ||00\01||False;[id number]: || ||00\01||False;[id number]: || ||00\01||False; PHYSICAL THERAPY HIP TREATMENT NOTE - INPATIENT      Room Number: 354/354-A     Session: 3  Number of Visits to Meet Established Goals: 5    Presenting Problem: S/p Right MAYTE on 02/24/17    Problem List  Active Problems:    Osteoarthritis of right hip standing up from a chair with arms (e.g., wheelchair, bedside commode, etc.): None   -   Moving from lying on back to sitting on the side of the bed?: None   How much help from another person does the patient currently need. ..   -   Moving to and from a be presents with decreased strength, dec flexibility, hip precautions limitations, dec balance, pain and dec activity tolerance.  Pt continues to benefit from skilled PT.      DISCHARGE RECOMMENDATIONS  PT Discharge Recommendations: Home with home health PT

## (undated) DEVICE — PROXIMATE SKIN STAPLERS (35 WIDE) CONTAINS 35 STAINLESS STEEL STAPLES (FIXED HEAD): Brand: PROXIMATE

## (undated) DEVICE — 3M™ STERI-DRAPE™ U-DRAPE 1015: Brand: STERI-DRAPE™

## (undated) DEVICE — SOL  .9 1000ML BTL

## (undated) DEVICE — STERILE POLYISOPRENE POWDER-FREE SURGICAL GLOVES: Brand: PROTEXIS

## (undated) DEVICE — SUTURE VICRYL 0 CP-1

## (undated) DEVICE — INTENDED TO AID IN THE PASSING OF SUTURES THROUGH BONE AND SOFT TISSUE DURING ORTHOPEDIC SURGERY: Brand: HOFFEE SUTURE RETRIEVER

## (undated) DEVICE — DRESSING AQUACEL AG 3.5 X 10

## (undated) DEVICE — SUTURE VICRYL 2-0 FSL

## (undated) DEVICE — MLPD DISPOSABLE PAD (6' ROLL) 3 ROLLS: Brand: SCHAERER MEDICAL USA

## (undated) DEVICE — TOTAL HIP CDS: Brand: MEDLINE INDUSTRIES, INC.

## (undated) DEVICE — SOL  .9 3000ML

## (undated) DEVICE — 1010 S-DRAPE TOWEL DRAPE 10/BX: Brand: STERI-DRAPE™

## (undated) DEVICE — BIT DRL 30MM 3.2MM RNGLC ACTB

## (undated) DEVICE — CHLORAPREP ORANGE TINT 10.5ML

## (undated) DEVICE — Device: Brand: STABLECUT®

## (undated) DEVICE — BIT DRL 2.7MM PERI SCR

## (undated) DEVICE — STOCKINETTE HYDROMED 8X6

## (undated) DEVICE — SUTURE ETHIBOND 5 CCS

## (undated) DEVICE — KENDALL SCD EXPRESS SLEEVES, KNEE LENGTH, MEDIUM: Brand: KENDALL SCD

## (undated) DEVICE — 2C14 #2 PDO 45 X 45: Brand: 2C14 #2 PDO 45 X 45

## (undated) DEVICE — LIGHT HANDLE

## (undated) DEVICE — SUTURE VICRYL 2-0 CP-1

## (undated) DEVICE — 3M™ IOBAN™ 2 ANTIMICROBIAL INCISE DRAPE 6650EZ: Brand: IOBAN™ 2

## (undated) DEVICE — FAN SPRAY KIT: Brand: PULSAVAC®

## (undated) DEVICE — PIN FX 2.5IN 1/8IN STNM SS: Type: IMPLANTABLE DEVICE | Site: SHOULDER

## (undated) DEVICE — GLOVE SURG SENSICARE SZ 8-1/2

## (undated) DEVICE — GOWN,SIRUS,FABRIC-REINFORCED,LARGE: Brand: MEDLINE

## (undated) DEVICE — SUTURE ETHIBOND 2 OS-4

## (undated) DEVICE — ORTHO CDS-LF: Brand: MEDLINE INDUSTRIES, INC.

## (undated) DEVICE — SUTURE ETHIBOND 1 OS-6

## (undated) DEVICE — PIN STEINMAN SMOOTH 1/8 9

## (undated) DEVICE — BIT DRL 3.2MM CMPRH CNTR SCR

## (undated) NOTE — IP AVS SNAPSHOT
Patient Demographics     Address  [address] [place] [address] Drive [address] Phone  [phone] [facility])  [phone] (Mobile) *Preferred* E-mail Address  [email or web address]      Emergency Contact(s)     Name Relation Home Work [address] 03 fingers between the strap and your arm. Your wrist should be secured against the chest wall. (The upper arm cuff is open/not in place)    #4 Next you will wrap the upper surgical arm in the cuff, holding your arm against the side of your body.   Allow martha wrist, and hand while keeping your arm at your side  ? DO NOT rotate your arm out to the side. No smoking  ? Avoid smoking. Known to cause breathing problems and can decrease the rate of healing. Discomfort  ?  Surgical discomfort is normal for one t ? There could be a small amount of redness around the staples or incision; this is normal.  ? Watch for increased redness, warmth, any odor, increased drainage or opening of the incision. ? Call physician if you notice these changes.   ? Sutures/staples wi Notify your surgeon if you notice any of the following signs  ? Separation of incision line. ? Increased redness, swelling, or warmth of skin around incision. ? Increased or foul smelling drainage from incision  ? Red streaks on skin near incision. ?  Te amoxicillin 500 MG Tabs  Commonly known as:  AMOXIL      DNT          Azelastine HCl 0.1 % Soln  Commonly known as:  ASTELIN      1 spray by Nasal route 2 (two) times daily.  Or as needed   Nayeli Galindo MD         CRANBERRY OR  Next dose due:  T TAKE 1 TABLET(20 MG) BY MOUTH DAILY WITH FOOD   Oanh MD Preet               Where to Get Your Medications      Please  your prescriptions at the location directed by your doctor or nurse    Bring a paper prescription for each of these medications Ordering provider:  Janak Monroy MD  08/01/20 5660 Resulting lab:  [address] [facility])    Specimen Information    Type Source Collected On   Blood — 08/01/20 1037          Components    Component Value Reference Range Flag Lab Melonie Brown  [address] [id number] 03/19/20 1441 - Present            Microbiology Results (All)     None      Pending Labs     Order Current Status    SURGICAL PATHOLOGY TISSUE In process         H&P - H&P Note      H&P sig discomfort. She had injections of corticosteroid at that date as well. She was originally scheduled for surgery on 03/13/2020 but canceled her surgical intervention. She returned in June 2020.   An MRI was obtained which showed tendinopathy with evidence replacement; right knee replacement, ; colonoscopy; left knee replacement,  by Dr. Gricelda Madrid; ; colonoscopy.     MEDICATIONS:  Magnesium, mupirocin, simvastatin, Xarelto, meclizine, lisinopril/hydrochlorothiazide, amoxicillin, cyclobenzaprine, Filed:  7/31/2020  4:48 PM Date of Service:  7/31/2020  4:24 PM Status:  Signed    :  Soto Galdamez MD (Physician)       [address]      Reason for consult: post-op medical management     Consulted by: Dr. Patricia Andujar    PCP: Tae Pierre, Resolved last addressed  11/16/16   • right knee paulette sx  global exp 5/19/16 2/23/2016   • Saddle embolus of pulmonary artery with acute cor pulmonale, unspecified chronicity [facility] 10/29/2018   • Subluxation of metatarsophalangeal joint of lesser toe, left acetaminophen 500 MG Oral Tab, Take 500 mg by mouth every 6 (six) hours as needed for Pain., Disp: , Rfl:   Enoxaparin Sodium 150 MG/ML Subcutaneous Solution, Inject 1 mg/kg into the skin every 12 (twelve) hours. , Disp: , Rfl:   HYDROcodone-acetaminophen 1 HCl **OR** oxyCODONE HCl **OR** oxyCODONE HCl, HYDROmorphone HCl **OR** HYDROmorphone HCl **OR** HYDROmorphone HCl       ALL:  No Known Allergies     Soc Hx:  Social History    Tobacco Use      Smoking status: Never Smoker      Smokeless tobacco: Never Use No results for input(s): ALT, AST, ALB, AMYLASE, LIPASE, LDH in the last 168 hours.     Invalid input(s): ALPHOS, TBIL, DBIL, TPROT      Radiology: Xr Shoulder, (1 View), Left (cpt=73020)    Result Date: 7/31/2020  PROCEDURE:  XR SHOULDER, (1 VIEW), LEFT (C No notes of this type exist for this encounter.         Physical Therapy Notes (last 72 hours) (Notes from 7/29/2020  1:22 PM through 8/1/2020  1:22 PM)      Physical Therapy Note signed by Zachery Campbell PT at 8/1/2020 12:11 PM  Version 1 of 1    Author: Pertinent co-morbidities include h/o DVT and PE, ischemic heart disease, DLD, HTN, FRANCI, CPAP, B nuclear sclerotic cataracts, anxiety, depression, hearing loss, and wears glasses.      Problem List  Active Problems:    * No active hospital problems.  * AHI 59 overall RDI 59 SaO2 brenda 81 % CPAP 7 [employer]   • Visual impairment     contacts and glasses   • Wrist sprain, left, initial encounter 11/30/2016       Past Surgical History  Past Surgical History:   Procedure Laterality Date   • ABDUCTOR HALLUCIS BR Prior Level of Function: Pt lives in a two story home with her spouse. Master bedroom/bathroom is located on the main level. Pt reports independent with ADLs/IADLs PTA. She reports limited with LUE movement/participation in ADLs d/t pain.  Pt and spouse are AM-PAC ‘6-Clicks’ Inpatient Daily Activity Short Form  How much help from another person does the patient currently need…  -   Putting on and taking off regular lower body clothing?: A Little  -   Bathing (including washing, rinsing, drying)?: A Little  - supervision. Pt able to perform toileting routine, including doffing underwear and julianne-area care with supervision. Pt reporting small steps to enter home.  Pt able to perform functional mobility in hospital hallways to therapy gym with supervision and w/o problem-focused assessments, limited treatment options    Overall Complexity LOW     OT Discharge Recommendations: Home; Intermittent Supervision; Outpatient OT  OT Device Recommendations: None; Other (Comment)(pt owns recommended equipment)    PLAN  OT Treat Provider Miladys Claudio    8/20/2020 9:30 AM Fred Christianson  [facility]    5/17/2021 9:00 AM NANDINI Friedman [facility] PULMONARY AT [address] [facility] [place]      Multidisciplinary Problems     Active Goals

## (undated) NOTE — IP AVS SNAPSHOT
Patient Demographics     Address  [address] [address] Phone  [phone] [facility]) *Preferred*  [phone] [facility]) E-mail Address  [email or web address]      Emergency Contact(s)     Name Relation Home Work [id number] ? For hip replacement surgery, follow instructions provided by physical therapy    No smoking  ? Avoid smoking. It is known to cause breathing problems and can decrease the rate of healing. Incision care/Dressing changes  ?  Wash hands before and after d ? Surgical discomfort is normal for one to two months. ? Have realistic goals and keep a positive outlook. ? Keep pain manageable; pain should not disrupt your sleep or activities like getting out of bed or walking.   ? You may need pain medication regula or are visiting. ? Keep bed linen/clothing freshly laundered. ? Do not allow others or pets to touch your incision. ? Avoid people that have colds or the flu. ?  Your surgeon may recommend that you take antibiotics before you undergo any dental or other ? Turning in or out of surgical leg that is new  ? Increased numbness, tingling to leg  ? Inability to walk or put weight on your surgical leg      Signs of blood clot  ?  Pain, excessive tenderness, redness, or swelling in leg or calf (other than incision Check blood work (CBC, BMP) at the follow up with Dr. Radha White MD  Please check blood pressure daily, keep record and bring to the appointment with Dr. Radha White MD  Seek medical attention for systolic blood pressure less than 90 mm or greater than 16 Take 1 tablet by mouth every 6 (six) hours as needed for Pain. ISAAC ANTONIO         Lisinopril-Hydrochlorothiazide 20-12.5 MG Tabs      Take 1 tablet by mouth daily. Zeke Gonzalez MD         Magnesium 100 MG Tabs      Take 100 mg by mouth daily. [id number] docusate sodium (COLACE) cap 100 mg 10/20/18 2030 Given      [id number] docusate sodium (COLACE) cap 100 mg 10/21/18 0842 Given      [id number] magnesium hydroxide (MILK OF MAGNESIA) 400 MG/5ML suspension 30 mL 10/20/18 1408 Given      [id number] r [facility] [address] 02/03/16 1201 - Present            Microbiology Results (All)     None         H&P - H&P Note      H&P signed by Kevin Chairez MD at 10/8/2018 10:56 AM   Version 1 of 1    Author:  Kevin Chairez MD Service:  Orthopedics Author Type: PAST MEDICAL HISTORY:  Ischemic heart disease, dyslipidemia, hypertension, FRANCI, CPAP, history of DVT, nuclear sclerotic cataract both eyes, posterior vitreous detachment bilaterally, left wrist sprain, anxiety, adrenal nodule, depression, subluxation MCP j be in the hospital 2 days and will probably go home with Home Health. Sizes will be similar to her opposite right hip which was a 52 mm cup, 40 and 25 mm long screws, neutral liner, an M/L taper 7.5 standard stem, 36 mm head with a -3.5 mm neck.   The shefali contacts and glasses     Past Surgical History:   Procedure Laterality Date   • ABDUCTOR HALLUCIS BREVIS TENDON TRANSFER/LENGTHENING Left 5/15/2017    Performed by Ray Teran DPM at [address] [facility] Food insecurity - inability: Not on file      Transportation needs - medical: Not on file      Transportation needs - non-medical: Not on file    Occupational History      Not on file    Tobacco Use      Smoking status: Never Smoker      Smokeless toba Lisinopril-Hydrochlorothiazide 20-12.5 MG Oral Tab Take 1 tablet by mouth daily. Disp: 90 tablet Rfl: 3   Magnesium 100 MG Oral Tab Take 100 mg by mouth daily. Disp:  Rfl:    CRANBERRY OR Take 1 tablet by mouth daily.    Disp:  Rfl:[ID.2]      ROS 10 syst Assistant    Filed:  10/20/2018  4:44 PM Date of Service:  10/20/2018  1:18 PM Status:  Signed    :  Rich Thomas PTA (Physical Therapy Assistant)       PHYSICAL THERAPY HIP TREATMENT NOTE - INPATIENT      Room Number: 356/356-A     Session: POD 1 Performed by Swati Lin MD at [facility] MAIN OR   • KNEE MANIPULATION Right 2/19/2016    Performed by Swati Lin MD at [facility] MAIN OR   • KNEE REPLACEMENT SURGERY  2010    L knee    (mochel)   • KNEE REPLACEMENT SURGERY  12/4/15    right TKR   • KNEE TOTAL Gait Assessment   Gait Assistance: Dependent assistance(Actual CGA)  Distance (ft): 20  Assistive Device: Rolling walker  Pattern: L Decreased stance time(decreased L knee flexion, L antalgic)  Stoop/Curb Assistance: Not tested  Comment : Based on FIM defi Patient End of Session: Up in chair;Needs met;Call light within reach; All patient questions and concerns addressed;SCDs in place; Alarm set    ASSESSMENT[AK.1]   This pt is s/p L posterior MAYTE on 10/19/18  This pt presents with the following: decreased stre Cosign Required:  Yes       PHYSICAL THERAPY HIP EVALUATION - INPATIENT     Room Number: 356/356-A  Evaluation Date: 10/19/2018  Type of Evaluation: Initial[MP.1]  Physician Order: PT Eval and Treat    Presenting Problem: s/p L posterior MAYTE 10/19/18[MP. 2 Performed by Jocelin Hart DPM at [address] Left 5/15/2017    [facility] Left 5/15/2017    Performed by evaluation and attempting to walk in hallway.     Patient self-stated goal is to go home    OBJECTIVE[MP.1]  Precautions: MAYTE - posterior  Fall Risk: High fall risk[MP.2]    WEIGHT BEARING RESTRICTION[MP.1]  Weight Bearing Restriction: None[MP.2] -   Need to walk in hospital room?: A Little   -   Climbing 3-5 steps with a railing?: A Little[MP. 3]       AM-PAC Score:[MP.1]  Raw Score: 18   PT Approx Degree of Impairment Score: 46.58%   Standardized Score (AM-PAC Scale): 43.63   CMS Modifier (G-Code) MAYTE.  Pertinent comorbidities and personal factors impacting therapy include  L TKA 2010, R TKA 12/2015 with manipulation under anesthesia in Feb 2016, R MAYTE 2/2017, L ankle surgery, h/o DVT and PE, ischemic heart disease, DLD, HTN, FRANCI, CPAP, B nuclear sc Goal Comments: Goals established on 10/19/2018[MP.1]     Attribution Field    MP.1 - Karla Clarke on 58/35/7337  1:78 PM  MP.2 - Karla Clarke on 23/33/1545  5:71 PM  MP.3 - Karla Clarke on 31/68/5272  3:24 PM               Physical Therapy Note sign • Depression    • Hearing impairment     right ear hearing aid   • High blood pressure    • High cholesterol    • OSTEOARTHRITIS    • Pulmonary embolism (HCC) 07/2017   • Unspecified sleep apnea PSG 11-10-14 TX 12-17-14    AHI 59 overall RDI 59 SaO2 brenda Patient Owned Equipment: Rolling walker  Patient Regularly Uses: Glasses    Prior Level of Dana: Pt lives in [place] home with  in [place], master bed/bath on main level.  Pt reports independent with all mobility, self-care, cooking, cleanin blankets)?: A Little   -   Sitting down on and standing up from a chair with arms (e.g., wheelchair, bedside commode, etc.): A Little   -   Moving from lying on back to sitting on the side of the bed?: A Little   How much help from another person does the Gait training  Neuromuscular re-educate  Posture  Transfer training    Patient End of Session: Up in chair;Needs met;Call light within reach; All patient questions and concerns addressed;SCDs in place; Alarm set    ASSESSMENT   Patient is a 80year old femal Patient is able to ambulate 300 feet with assistive device at assistance level: modified independent    Goal #4    Patient will negotiate 4 stairs/one curb w/ assistive device and supervision   Goal #5    Patient verbalizes and/or demonstrates all precau left lower leg DVT- treated and resolved   • Depression    • Hearing impairment     right ear hearing aid   • High blood pressure    • High cholesterol    • OSTEOARTHRITIS    • Pulmonary embolism ([facility].) 07/2017   • Unspecified sleep apnea PSG 11-10-14 TX 1 Lives With: Spouse                     Drives: Yes  Patient Regularly Uses: Glasses[MS. 2]    Prior Level of Function: Pt lives in two-story home with  in [place], master bed/bath on main level.  Pt reports independent with all mobility, self-care,  precautions. Pt ambulated within room to collect clothing from closet, seated at chair and completed getting dressed with reacher. Pt able to demonstrate good knowledge of all a/e to use for LE dressing.  Pt ambulated 500 feet within the hallway with superv Specific performance deficits impacting engagement in ADL/IADL  LOW  1 - 3 performance deficits    Client Assessment/Performance Deficits  LOW - No comorbidities nor modifications of tasks    Clinical Decision Making  LOW - Analysis of occupational profile 11/26/2018 10:00 AM Araceli Flores, AUD [address], ENT - [address]    4/11/2019 11:00 AM NANDINI [facility] PULMONARY AT [address]    5/20/2019 1:10 PM Jose Francisco Holder MD [address] [facility] [address]

## (undated) NOTE — IP AVS SNAPSHOT
[address]            (For Outpatient Use Only) Initial Admit Date: 7/31/2020   Inpt/Obs Admit Date: Inpt: N/A / Obs: N/A   Discharge Date:    Hospital Acct:  [de-identified]   MRN: [de-identified]   CSN: [id number]   CEID: [id number]        E Subscriber ID:  Pt Rel to Subscriber:    Hospital Account Financial Class: Medicare Advantage    August 1, 2020

## (undated) NOTE — LETTER
[facility] Testing Department  Phone: [phone]  OUTSIDE TESTING RESULT REQUEST      TO:   Dr. Arlyn Jorge Date: 7/20/20    FAX #: [phone]    Patient stated she had her EKG done in your office 7/13/2020  Please FAX signed EKG to:

## (undated) NOTE — IP AVS SNAPSHOT
[facility] [place] [address] [place], [address] ~ [phone]                Discharge Summary   2/24/2017    Francoise Skelton           Admission Information        Provider Department    2/24/2017 Jens Akbar MD  3sw-A         Susan Sifuentes ferrous sulfate 75 (15 Fe) MG/ML Soln        Take by mouth daily. FISH OIL OR        Take  by mouth daily.                          Fluticasone Propionate 50 MCG/ACT Susp   Commonly known as:  FLONASE        1 spray by Nasal rout Do not take this med while taking xarelto! May resume after xarelto prescription is complete. Etodolac 500 MG Tabs   Commonly known as:  LODINE        Take 1 tablet (500 mg total) by mouth 2 (two) times daily.     Chester Marcano                 Do not ? Usually allowed after four to six weeks – check with surgeon at your office visit. Return to work  ? Usually allowed after four to six weeks. Discuss specific work activities with your surgeon. Restrictions  ?  For hip replacement surgery, fol blood in your urine. Use electric razors and soft toothbrushes only. ? Do not take aspirin while taking blood thinners unless ordered by your physician. ? Review anticoagulant education information sheet provided. Discomfort  ?  Surgical discomfort is ? An enema or suppository may be needed if above measures do not work. Prevention of infection and promotion of healing  ? Good hand washing is important.  Everyone should wash their hands or use hand  as soon as they walk in your house[facility] ? Increased or foul smelling drainage from incision  ? Red streaks on skin near incision. ? Temperature >100.4F.  ? Increased pain at incision not relieved by pain medication. Signs of Possible Dislocation  ? Increased severe leg or groin pain  ?  Luba Lyon space to open car doors to position yourself properly with walker to get in and out of your car safely; some parking spaces are  practically on top of each other and do not give you enough room.      SPECIAL  INSTRUCTIONS:      View Dr. Irwin Overall discharge in Future Appointments     Mar 07, 2017  2:00 PM   Rehabilitation with [address] ([address] [place] [address])    [address] [address]   [phone]            Mar 09, 2017 11:30 AM   REHABILITATION with [facility] Juanpablo Barroso Neutrophil % Lymphocyte % Monocyte % Eosinophil % Basophil % Prelim Neut Abs Final Neut Abs Lymphocyte Abso Monocyte Absolu Eosinophil Abso Basophil Absolu    (02/06/17)  68.5 (02/06/17)  22.8 (02/06/17)  7.2 (02/06/17)  0.7 (02/06/17)  0.5 -- (02/06/17) You can access your MyChart to more actively manage your health care and view more details from this visit by going to [email or web address].   If you've recently had a stay at the Hospital you can access your discharge instructions in [address] by nely Blood Thinners (Anticoagulants)     rivaroxaban (XARELTO) 10 MG Oral Tab       Use: Prevent the development or progression of blood clots   Most common side effects: Abnormal bleeding   What to report to your healthcare team: Bruising, blood in urine or st Fluticasone Propionate (FLONASE) 50 MCG/ACT Nasal Suspension       Use:  Treatment of asthma, COPD/emphysema, cough, allergies   Most common side effects: Headache, nasal irritation, palpitations, increased heart rate, increased blood pressure, allergic r

## (undated) NOTE — LETTER
[facility] Testing Department  Phone: [phone]  [address] By:  Simeon Alston    Date: 17    Patient Name: Chao Farm  Surgery Date: 2017    CSN: [id number]  Medical Record: [id number]   : 3/20/1950 - A: 73 y      Sex

## (undated) NOTE — ED AVS SNAPSHOT
Melo    MRN: [id number]    Department:  [facility] Emergency Department   Date of Visit:  11/18/2017           Disclosure     Insurance plans vary and the physician(s) referred by the ER may not be covered by your plan.  Please contact your If you have been prescribed any medication(s), please fill your prescription right away and begin taking the medication(s) as directed    If the emergency physician has read X-rays, these will be re-interpreted by a radiologist.  If there is a significant

## (undated) NOTE — IP AVS SNAPSHOT
Patient Demographics     Address Phone E-mail Address    [address] [phone] [facility]) *Preferred*  [phone] [facility]) [email or web address]      Emergency Contact(s)     Name Relation Home Work [employer] Spouse [phone] ? For hip replacement surgery, follow instructions provided by physical therapy    No smoking  ? Avoid smoking. It is known to cause breathing problems and can decrease the rate of healing. Incision care/Dressing changes  ?  Wash hands before and after d ? Surgical discomfort is normal for one to two months. ? Have realistic goals and keep a positive outlook. ? Keep pain manageable; pain should not disrupt your sleep or activities like getting out of bed or walking.   ? You may need pain medication regula or are visiting. ? Keep bed linen/clothing freshly laundered. ? Do not allow others or pets to touch your incision. ? Avoid people that have colds or the flu. ?  Your surgeon may recommend that you take antibiotics before you undergo any dental or other ? Turning in or out of surgical leg that is new  ? Increased numbness, tingling to leg  ? Inability to walk or put weight on your surgical leg      Signs of blood clot  ?  Pain, excessive tenderness, redness, or swelling in leg or calf (other than incision [email or web address]      [address] [address]  P: [phone]  F: [phone]        Do not take asa while on Xarelto. May resume ASA if ok with Dr Darnell Talbert & primary care  After Johnie Sever prescription is finished.     Follow-up Inf Magnesium 100 MG Tabs        Take  by mouth daily.                             Mupirocin Calcium 2 % Crea   Commonly known as:  BACTROBAN        Use Q-tip to apply pea size amount of ointment into each nostril twice daily to begin 5 days prior to surgery w prescription is complete.             Where to Get Your Medications      Please  your prescriptions at the location directed by your doctor or nurse     Bring a paper prescription for each of these medications    - docusate sodium 100 MG Caps  - HYDR Patient Weight 77.111 kg (170 lb)      CPAP Settings (Inpatient)       Most Recent Value    Mode Spontaneous    Interface Nasal mask    Mask size -- [pt own]    O2% 21 %         Lab Results Last 24 Hours (02/26/17 - 02/26/17)      CBC, PLATELET; NO DIFFER complaint of pain and discomfort involving her hip. She had her knee replaced in May 2016. She notes ongoing pain with movement and rotation of her hip. I saw her for her hip in September 2016.   At that time, she had limitation of motion with no interna FAMILY HISTORY:  The father is ; he had cancer. Mother is alive; she has a heart disorder. Brother is alive. A sister is . REVIEW OF SYSTEMS:  Negative.       PHYSICAL EXAMINATION:    GENERAL:  The patient stands 5 feet 6 inches tall a tolerated the procedure well without any immediate complications. Specifically, she denies N/V, lightheadness, chest pain, SOB, cough or fever.        Past Medical Hx:  Past Medical History   Diagnosis Date   • OSTEOARTHRITIS    • Depression    • Unspe • Atorvastatin Calcium  10 mg Oral Nightly   • TraZODone HCl  50 mg Oral Nightly   • Pantoprazole Sodium  40 mg Oral QAM AC   • docusate sodium  100 mg Oral BID   • ceFAZolin  2 g Intravenous Q8H   • OxyCODONE HCl ER  10 mg Oral [email or web address]   • acetamino Pulm: Lungs clear bilaterally, normal respiratory effort  CV: Heart with regular rate and rhythm, no murmur. Normal PMI.     GI: Abdomen soft, nontender, nondistended, no organomegaly, bowel sounds present  Ext: No cyanosis, clubbing, or edema  Skin: Skin :  Woodroe Curling, PTA (Physical Therapy Assistant)           PHYSICAL THERAPY HIP TREATMENT NOTE - INPATIENT      Room Number: 354/354-A     Session: 3  Number of Visits to Meet Established Goals: 5    Presenting Problem: S/p Right MAYTE on 02/24 -   Turning over in bed (including adjusting bedclothes, sheets and blankets)?: A Little   -   Sitting down on and standing up from a chair with arms (e.g., wheelchair, bedside commode, etc.): None   -   Moving from lying on back to sitting on the side of strength. Pt continues to have pain in right hip during activity. Pt is able to verbalize hip precautions, needs cues to maintain precautions during functional tasks.  Pt presents with decreased strength, dec flexibility, hip precautions limitations, dec ba Past Medical History   Past Medical History   Diagnosis Date   • OSTEOARTHRITIS    • Depression    • Unspecified sleep apnea PSG 11-10-14 TX 12-17-14     AHI 59 overall RDI 59 SaO2 brenda 81 % CPAP 7 [employer]   • Visual impairment      contacts and glasses   • -   Moving to and from a bed to a chair (including a wheelchair)?: None   -   Need to walk in hospital room?: None   -   Climbing 3-5 steps with a railing?: A Little       AM-PAC Score:  Raw Score: 22   PT Approx Degree of Impairment Score: 20.91%   Kyra Sis in strength, balance, endurance, mobility and functional independence, and will benefit from continued skilled PT during patient's IP stay to assist patient in returning to their prior level of function.       DISCHARGE RECOMMENDATIONS  PT Discharge Recomme Osteoarthritis of right hip      Past Medical History  Past Medical History   Diagnosis Date   • OSTEOARTHRITIS    • Depression    • Unspecified sleep apnea PSG 11-10-14 TX 12-17-14     AHI 59 overall RDI 59 SaO2 brenda 81 % CPAP 7 [employer]   • Visual impair Management Techniques: Activity promotion; Body mechanics;Breathing techniques;Relaxation;Repositioning    COGNITION  · Overall Cognitive Status:  WFL - within functional limits    RANGE OF MOTION AND STRENGTH ASSESSMENT  Upper extremity ROM and strength ar posterior hip precautions & reviewed them multiple times during this session. Issued handouts for same. Patient was able to perform supine to sit & sit stand with RW with min assist to CGA for safety. Ambulated with RW for 150 ft with CGA for safety.      Ex Goal #3    Patient is able to ambulate 300 feet with assistive device at assistance level:Supervision   Goal #4    Patient will negotiate 4 stairs/one curb w/ assistive device and supervision   Goal #5    Patient verbalizes and/or demonstrates all precauti KNEE REPLACEMENT SURGERY  12/4/15    Comment right TKR       SUBJECTIVE  \"I am excited to be going home today. \"    Patient self-stated goal is \"for my hip to feel less stiff. \"    OBJECTIVE  Precautions: MAYTE - posterior    WEIGHT BEARING RESTRICTION  W Pt seen for OT services. In this session, pt made significant progress towards OT goals with improvements in balance, endurance and independence in ADL tasks. Pt has met all IP OT goals and is d/c from IP OT at this time.   Pt is at sup level in basic ADLS AHI 59 overall RDI 59 SaO2 brenda 81 % CPAP 7 [employer]   • Visual impairment      contacts and glasses   • High cholesterol    • High blood pressure    • Deep vein thrombosis ([facility] [address] 2001     left lower leg DVT- treated and resolved       Past Surgical History Communication: WFL    Behavioral/Emotional/Social: Pt was motivated and cooperative throughout session.      RANGE OF MOTION AND STRENGTH ASSESSMENT  Upper extremity ROM is within functional limits   Upper extremity strength is within functional limits    C keep walker close to body when backing up to chair). Education on safe rw use in home environment and decreasing fall risk in  Home environment. Education on shower transfer (3 tiled walls and edge to step over, as well as education on use of [facility]). INFLUENZA 10/06/16     INFLUENZA 11/16/15     INFLUENZA 09/15/14     INFLUENZA 09/23/13     Pneumococcal (Prevnar 13) 03/12/15     Pneumovax 23 (Lot Mgr) 04/19/16     TDAP 02/21/11     Technetium Tc99mm Sestamibi, Iv, Up To 40 Millicuries 12/05/25     Zos

## (undated) NOTE — LETTER
Last Revised 02/07/06  Obstructive Sleep Apnea Questionnaire    Clinical signs and symptoms suggesting the possibility of FRANCI    1. Predisposing physical characteristics (positive with any of the following present)  ? BMI 35kg/m²  ?  Craniofacial abnormalit pauses which are frightening to the observer, patient regularly falls asleep within minutes after being left unstimulated) in which case they should be treated as though they have severe sleep apnea.     The sleep laboratory’s assessment (none, mild, modera Point Total for B           C. Requirement for postoperative opioids.                Opioid requirement             Points   None 0    Low dose oral opiod 1    High dose oral opioids, parenteral or neuraxial opiods 3      Point Total for C        Estimation

## (undated) NOTE — IP AVS SNAPSHOT
Patient Demographics     Address  [address] Phone  [phone] [facility]) *Preferred*  [phone] [facility]) E-mail Address  [email or web address]      Emergency Contact(s)     Name Relation Home Work [employer] Spouse [phone] Commonly known as:  ZOCOR      Take 1 tablet (20 mg total) by mouth nightly.    Isacc Jones MD   [    ]   [    ]   [    ]   [    ]     TraZODone HCl 50 MG Tabs  Commonly known as:  DESYREL      Take 1 tabs po 1-2 hours prior to bedtime   Isacc Jones MD   [ Interface  -- [patient's own mask and tubing]   Mask size  -- [pts own]   O2%  21 %         Lab Results Last 24 Hours      PTT, ACTIVATED [[id number]] (Abnormal)  Resulted: 07/07/17 1447, Result status: Final result   Ordering provider:  TAZ Luis eGFR calculated by the CKD-EPI equation. Calcium, Total 9.2 8.3 - 10.3 mg/dL — [facility]   Comment:           Total Calciums are not corrected for effects of low albumin. If needed, use the following correction formula.       Corrected Calcium Formul H&P signed by Floridalma Koroma DO at 7/6/2017  4:11 PM  Version 1 of 1    Author:  Floridalma Koroma DO Service:  (none) Author Type:  Physician    Filed:  7/6/2017  4:11 PM Date of Service:  7/6/2017  4:04 PM Status:  Signed    :  Floridalma Koroma Smoking status: Never Smoker    Smokeless tobacco: Never Used    Alcohol use Yes  0.0 oz/week     Comment: rarely - 1 per month        Fam Hx  Family History   Problem Relation Age of Onset   • Cancer Father      lung   • Heart Disorder Mother      CAD, s Neurologic: Moving all extremities spontaneously, no focal deficit appreciated     Data Review:    LABS:     Lab Results  Component Value Date   WBC 4.8 07/06/2017   HGB 11.6 07/06/2017   HCT 34.6 07/06/2017   .0 07/06/2017   CREATSERUM 0.96 07/06/2 VASCULATURE:  Filling defects involve left upper lobe segmental branch, left lower lobe subsegmental branches and distal right main pulmonary artery extending to the segmental branches of the lobes consistent with pulmonary emboli.  THORACIC AORTA:  Normal. PPX: on heparin gtt    Danny Peralta DO  [facility] Hospitalist  [phone]    Outpatient records or previous hospital records reviewed.    [facility] hospitalist to continue to follow patient while in house  A total of 70 minutes taken with patient and coordinating branch, left lower lobe subsegmental branches and distal right main pulmonary artery extending to the segmental branches of the lobes.   She is on anticoagulation with IV heparin   She is feeling well   S/p echo   No family history of venous thromboembolism Oral, Q4H PRN **OR** HYDROcodone-acetaminophen (NORCO) 5-325 MG per tab 2 tablet, 2 tablet, Oral, Q4H PRN  •  ondansetron HCl (ZOFRAN) injection 4 mg, 4 mg, Intravenous, Q6H PRN  •  Sertraline HCl (ZOLOFT) tab 50 mg, 50 mg, Oral, Daily  •  atorvastatin (LI RBC  3.80  3.84   HGB  11.6*  11.7*   HCT  34.6  35.6   MCV  91.1  92.7   MCH  30.5  30.5   MCHC  33.5  32.9   RDW  13.0  13.2   NEPRELIM  3.25  3.64   WBC  4.8  6.2   PLT  214.0  221.0             Imagin/6/17  CT CHEST ANGIOGRAM  FINDINGS:    VASCU Capsule Tendon Balancing 2nd & 3rd, Skin Plasty Left- Sx 05/15/17 TATA 8/13/17     Pulmonary embolism without acute cor pulmonale (HCC)     Other acute pulmonary embolism without acute cor pulmonale ([address]      1.  Recurrent venous thromboembolism     This is 7/12/2017 1:00 PM Cherylene Slicker, AUD [address], ENT - [address] [facility] [address] [facility]    7/25/2017 1:30 PM Nir Spencer MD [facility] OB/GYNE - [address] [place] [address]    8/22/2017 10:30 AM KAMRAN Padilla MEDIC

## (undated) NOTE — LETTER
[facility] Testing Department  Phone: [phone]  Right Fax: [phone]  [facility] 20 By:  Brandon Adkins RN Date: 17    Patient Name: Merle Scarwanda  Surgery Date: 2017    CSN: [id number]  Medical Record: [id number]   : 3/2